# Patient Record
Sex: FEMALE
[De-identification: names, ages, dates, MRNs, and addresses within clinical notes are randomized per-mention and may not be internally consistent; named-entity substitution may affect disease eponyms.]

---

## 2019-09-03 NOTE — EDM.PDOC
ED HPI GENERAL MEDICAL PROBLEM





- General


Chief Complaint: OB/GYN Problem


Stated Complaint: 12WEEKS CRAMP PREGNANT


Time Seen by Provider: 09/03/19 17:48


Source of Information: Reports: Patient


History Limitations: Reports: No Limitations





- History of Present Illness


INITIAL COMMENTS - FREE TEXT/NARRATIVE: 


HISTORY AND PHYSICAL:





History of present illness:


Patient is a 24-year-old female presents to the ED today with concern of lower 

abdominal pain and pregnancy. Patient states she is approximately 11-12 weeks 

along. Patient states she just moved here and does not have an OB/GYN 

established. Patient states she did have an ultrasound early on in pregnancy 

with confirmed intrauterine pregnancy. Patient states she was lifting boxes 

today and began to have lower abdominal pain/cramping. Patient denies any 

vaginal bleeding. Patient denies any direct trauma or injury.





Patient denies fever, chills, chest pain, shortness of breath, or cough. Denies 

headache, neck stiff ness, change in vision, syncope, or near syncope. Denies 

nausea, vomiting, diarrhea, constipation, or dysuria. Has not noted any blood 

in urine or stool. Patient has been eating and drinking appropriately.





Review of systems: 


As per history of present illness and below otherwise all systems reviewed and 

negative.





Past medical history: 


As per history of present illness and as reviewed below otherwise 

noncontributory.





Surgical history: 


As per history of present illness and as reviewed below otherwise 

noncontributory.





Social history: 


See social history for further information





Family history: 


As per history of present illness and as reviewed below otherwise 

noncontributory.





Physical exam:


General: Patient is alert, oriented, and in no acute distress. Patient sitting 

comfortably on exam table.


HEENT: Atraumatic, normocephalic, pupils equal and reactive bilaterally, 

negative for conjunctival pallor or scleral icterus, mucous membranes moist, 

TMs normal bilaterally, throat clear, neck supple, nontender, trachea midline. 

No drooling or trismus noted. No meningeal signs. No hot potato voice noted. 


Lungs: Clear to auscultation, breath sounds equal bilaterally, chest nontender.


Heart: S1S2, regular rate and rhythm without overt murmur


Abdomen: Soft, nondistended. Mild suprapubic tenderness. Negative for masses or 

hepatosplenomegaly. Negative for costovertebral tenderness.


Pelvis: Stable nontender.


Genitourinary: Deferred.


Rectal: Deferred.


Skin: Intact, warm, dry. No lesions or rashes noted.


Extremities: Atraumatic, negative for cords or calf pain. Neurovascular 

unremarkable.


Neuro: Awake, alert, oriented. Cranial nerves II through XII unremarkable. 

Cerebellum unremarkable. Motor and sensory unremarkable throughout. Exam 

nonfocal.





Notes:


Blood type A negative. Patient continues to deny vaginal bleeding.


Discussed the importance for follow-up with an OB/GYN provider.


Voices understanding and is agreeable to plan of care. Denies any further 

questions or concerns at this time.





Diagnostics:


CBC, CMP, UA, hCG, blood type, TVUS





Therapeutics:


None





Prescription:


None





Impression: 


Lower abdominal pain and pregnancy





Plan:


1. Please start and/or continue to take your prenatal vitamin with folic acid 

once daily.


2. Tylenol as needed for pain management. This is safe to use in pregnancy.


3. Follow up with your OB/GYN as discussed. Return to the ED as needed and as 

discussed.





Definitive disposition and diagnosis as appropriate pending reevaluation and 

review of above.





  ** Lower Abdomen


Pain Score (Numeric/FACES): 4





- Related Data


 Allergies











Allergy/AdvReac Type Severity Reaction Status Date / Time


 


No Known Allergies Allergy   Verified 09/03/19 17:42











Home Meds: 


 Home Meds





Pnv No.95/Ferrous Fum/Folic AC [Prenatal Caplet] 1 each PO DAILY 09/03/19 [

History]











Past Medical History


HEENT History: Reports: None


Cardiovascular History: Reports: None


Respiratory History: Reports: None


Gastrointestinal History: Reports: None


Genitourinary History: Reports: None


OB/GYN History: Reports: Pregnancy


Musculoskeletal History: Reports: None


Neurological History: Reports: None


Psychiatric History: Reports: Anxiety


Endocrine/Metabolic History: Reports: None


Hematologic History: Reports: None


Immunologic History: Reports: None


Oncologic (Cancer) History: Reports: None


Dermatologic History: Reports: None





- Infectious Disease History


Infectious Disease History: Reports: None





- Past Surgical History


Head Surgeries/Procedures: Reports: None





Social & Family History





- Tobacco Use


Smoking Status *Q: Former Smoker


Used Tobacco, but Quit: Yes


Month/Year Tobacco Last Used: 2019





- Caffeine Use


Caffeine Use: Reports: None





- Recreational Drug Use


Recreational Drug Use: No





ED ROS GENERAL





- Review of Systems


Review Of Systems: ROS reveals no pertinent complaints other than HPI.





ED EXAM, GENERAL





- Physical Exam


Exam: See Below (see dictation)





Course





- Vital Signs


Last Recorded V/S: 


 Last Vital Signs











Temp  37.1 C   09/03/19 17:39


 


Pulse  83   09/03/19 17:39


 


Resp      


 


BP  107/62   09/03/19 17:39


 


Pulse Ox  97   09/03/19 17:39














- Orders/Labs/Meds


Labs: 


 Laboratory Tests











  09/03/19 09/03/19 09/03/19 Range/Units





  17:58 17:58 17:58 


 


WBC  11.52 H    (4.0-11.0)  K/uL


 


RBC  4.01 L    (4.30-5.90)  M/uL


 


Hgb  12.8    (12.0-16.0)  g/dL


 


Hct  37.5    (36.0-46.0)  %


 


MCV  93.5    (80.0-98.0)  fL


 


MCH  31.9    (27.0-32.0)  pg


 


MCHC  34.1    (31.0-37.0)  g/dL


 


RDW Std Deviation  41.3    (28.0-62.0)  fl


 


RDW Coeff of Shante  12    (11.0-15.0)  %


 


Plt Count  249    (150-400)  K/uL


 


MPV  9.20    (7.40-12.00)  fL


 


Neut % (Auto)  80.4 H    (48.0-80.0)  %


 


Lymph % (Auto)  13.8 L    (16.0-40.0)  %


 


Mono % (Auto)  5.0    (0.0-15.0)  %


 


Eos % (Auto)  0.7    (0.0-7.0)  %


 


Baso % (Auto)  0.1    (0.0-1.5)  %


 


Neut # (Auto)  9.3 H    (1.4-5.7)  K/uL


 


Lymph # (Auto)  1.6    (0.6-2.4)  K/uL


 


Mono # (Auto)  0.6    (0.0-0.8)  K/uL


 


Eos # (Auto)  0.1    (0.0-0.7)  K/uL


 


Baso # (Auto)  0.0    (0.0-0.1)  K/uL


 


Nucleated RBC %  0.0    /100WBC


 


Nucleated RBCs #  0    K/uL


 


Sodium   138   (136-145)  mmol/L


 


Potassium   3.9   (3.5-5.1)  mmol/L


 


Chloride   102   ()  mmol/L


 


Carbon Dioxide   27.2   (21.0-32.0)  mmol/L


 


BUN   10   (7.0-18.0)  mg/dL


 


Creatinine   0.6   (0.6-1.0)  mg/dL


 


Est Cr Clr Drug Dosing   TNP   


 


Estimated GFR (MDRD)   > 60.0   ml/min


 


Glucose   96   ()  mg/dL


 


Calcium   9.2   (8.5-10.1)  mg/dL


 


Total Bilirubin   0.2   (0.2-1.0)  mg/dL


 


AST   14 L   (15-37)  IU/L


 


ALT   22   (14-63)  IU/L


 


Alkaline Phosphatase   29 L   ()  U/L


 


Total Protein   6.6   (6.4-8.2)  g/dL


 


Albumin   3.3 L   (3.4-5.0)  g/dL


 


Globulin   3.3   (2.6-4.0)  g/dL


 


Albumin/Globulin Ratio   1.0   (0.9-1.6)  


 


HCG, Qual     (NEG)  


 


HCG, Quant   654972.0   mIU/mL


 


Urine Color     


 


Urine Appearance     


 


Urine pH     (5.0-8.0)  


 


Ur Specific Gravity     (1.001-1.035)  


 


Urine Protein     (NEGATIVE)  mg/dL


 


Urine Glucose (UA)     (NEGATIVE)  mg/dL


 


Urine Ketones     (NEGATIVE)  mg/dL


 


Urine Occult Blood     (NEGATIVE)  


 


Urine Nitrite     (NEGATIVE)  


 


Urine Bilirubin     (NEGATIVE)  


 


Urine Urobilinogen     (<2.0)  EU/dL


 


Ur Leukocyte Esterase     (NEGATIVE)  


 


Blood Type    A NEGATIVE  














  09/03/19 09/03/19 Range/Units





  17:58 19:35 


 


WBC    (4.0-11.0)  K/uL


 


RBC    (4.30-5.90)  M/uL


 


Hgb    (12.0-16.0)  g/dL


 


Hct    (36.0-46.0)  %


 


MCV    (80.0-98.0)  fL


 


MCH    (27.0-32.0)  pg


 


MCHC    (31.0-37.0)  g/dL


 


RDW Std Deviation    (28.0-62.0)  fl


 


RDW Coeff of Shante    (11.0-15.0)  %


 


Plt Count    (150-400)  K/uL


 


MPV    (7.40-12.00)  fL


 


Neut % (Auto)    (48.0-80.0)  %


 


Lymph % (Auto)    (16.0-40.0)  %


 


Mono % (Auto)    (0.0-15.0)  %


 


Eos % (Auto)    (0.0-7.0)  %


 


Baso % (Auto)    (0.0-1.5)  %


 


Neut # (Auto)    (1.4-5.7)  K/uL


 


Lymph # (Auto)    (0.6-2.4)  K/uL


 


Mono # (Auto)    (0.0-0.8)  K/uL


 


Eos # (Auto)    (0.0-0.7)  K/uL


 


Baso # (Auto)    (0.0-0.1)  K/uL


 


Nucleated RBC %    /100WBC


 


Nucleated RBCs #    K/uL


 


Sodium    (136-145)  mmol/L


 


Potassium    (3.5-5.1)  mmol/L


 


Chloride    ()  mmol/L


 


Carbon Dioxide    (21.0-32.0)  mmol/L


 


BUN    (7.0-18.0)  mg/dL


 


Creatinine    (0.6-1.0)  mg/dL


 


Est Cr Clr Drug Dosing    


 


Estimated GFR (MDRD)    ml/min


 


Glucose    ()  mg/dL


 


Calcium    (8.5-10.1)  mg/dL


 


Total Bilirubin    (0.2-1.0)  mg/dL


 


AST    (15-37)  IU/L


 


ALT    (14-63)  IU/L


 


Alkaline Phosphatase    ()  U/L


 


Total Protein    (6.4-8.2)  g/dL


 


Albumin    (3.4-5.0)  g/dL


 


Globulin    (2.6-4.0)  g/dL


 


Albumin/Globulin Ratio    (0.9-1.6)  


 


HCG, Qual  POSITIVE H   (NEG)  


 


HCG, Quant    mIU/mL


 


Urine Color   YELLOW  


 


Urine Appearance   CLEAR  


 


Urine pH   6.5  (5.0-8.0)  


 


Ur Specific Gravity   1.015  (1.001-1.035)  


 


Urine Protein   NEGATIVE  (NEGATIVE)  mg/dL


 


Urine Glucose (UA)   NEGATIVE  (NEGATIVE)  mg/dL


 


Urine Ketones   NEGATIVE  (NEGATIVE)  mg/dL


 


Urine Occult Blood   NEGATIVE  (NEGATIVE)  


 


Urine Nitrite   NEGATIVE  (NEGATIVE)  


 


Urine Bilirubin   NEGATIVE  (NEGATIVE)  


 


Urine Urobilinogen   0.2  (<2.0)  EU/dL


 


Ur Leukocyte Esterase   NEGATIVE  (NEGATIVE)  


 


Blood Type    














Departure





- Departure


Time of Disposition: 19:54


Disposition: Home, Self-Care 01


Clinical Impression: 


 Abdominal cramping affecting pregnancy








- Discharge Information


Referrals: 


PCP,None [Primary Care Provider] - 


Forms:  ED Department Discharge


Additional Instructions: 


The following information is given to patients seen in the emergency department 

who are being discharged to home. This information is to outline your options 

for follow-up care. We provide all patients seen in our emergency department 

with a follow-up referral.





The need for follow-up, as well as the timing and circumstances, are variable 

depending upon the specifics of your emergency department visit.





If you don't have a primary care physician on staff, we will provide you with a 

referral. We always advise you to contact your personal physician following an 

emergency department visit to inform them of the circumstance of the visit and 

for follow-up with them and/or the need for any referrals to a consulting 

specialist.





The emergency department will also refer you to a specialist when appropriate. 

This referral assures that you have the opportunity for follow-up care with a 

specialist. All of these measure are taken in an effort to provide you with 

optimal care, which includes your follow-up.





Under all circumstances we always encourage you to contact your private 

physician who remains a resource for coordinating your care. When calling for 

follow-up care, please make the office aware that this follow-up is from your 

recent emergency room visit. If for any reason you are refused follow-up, 

please contact the [facility] Emergency 

Department at [phone] and asked to speak to the emergency department 

charge nurse.





[facility]


Primary Care


[address] [place] [id number]


Phone: [phone]


Fax: [phone]





[facility]


[address] [place] [address]


Phone: [phone]


Fax: [phone]





[facility]


[address] [place] [address]


Phone: [phone]


Fax: [phone]





1. Please start and/or continue to take your prenatal vitamin with folic acid 

once daily.


2. Tylenol as needed for pain management. This is safe to use in pregnancy.


3. Follow up with your OB/GYN as discussed. Return to the ED as needed and as 

discussed.

## 2019-09-03 NOTE — US
INDICATION: low pelvic pain x 2 days



pain has lessened



denies bleeding



OBSTETRICAL ULTRASOUND



Technique:  Transabdominal scanning of the pelvis was performed.



Findings:   The uterus contains a gestational sac.  The gestational sac 

contains a yolk sac and an embryonic pole which exhibits cardiac activity 

with a heart rate of 163 BPM.  The crown-rump length corresponds to an 

estimated menstrual age of 12 weeks 0 days and an SAURABH of 3/17/2020.



The ovaries are not visualized.  No significant free pelvic fluid is 

identified.



IMPRESSION:  Live early intrauterine pregnancy with estimated menstrual age 

of 12 weeks 0 days and SAURABH of 3/17/2020.  No acute abnormality is seen.



SILVIA RYDER MD



[facility]



Dictated by: Praneeth Ryder MD @ 09/03/2019 19:51:27



(Electronically Signed)

## 2020-03-21 ENCOUNTER — HOSPITAL ENCOUNTER (INPATIENT)
Dept: HOSPITAL 56 - MW.OBCHECK | Age: 25
LOS: 3 days | Discharge: HOME | End: 2020-03-24
Attending: OBSTETRICS & GYNECOLOGY | Admitting: OBSTETRICS & GYNECOLOGY
Payer: MEDICAID

## 2020-03-21 DIAGNOSIS — O48.0: Primary | ICD-10-CM

## 2020-03-21 DIAGNOSIS — Z3A.40: ICD-10-CM

## 2020-03-21 RX ADMIN — MISOPROSTOL PRN MCG: 100 TABLET ORAL at 21:12

## 2020-03-21 RX ADMIN — MISOPROSTOL PRN MCG: 100 TABLET ORAL at 21:06

## 2020-03-21 NOTE — PCM.LDHP
L&D History of Present Illness





- General


Date of Service: 20


Admit Problem/Dx: 


 Patient Status Order with Admit Dx/Problem





20 20:20


Patient Status [ADT] Routine 








 Admission Diagnosis/Problem











Admission Diagnosis/Problem    Pregnant - planned














20 21:37


23 yo  admit for elective IOL at 40 +0 weeks; A-, Rubella non-immune, GBS 

negative, SVE 2/70/-3 per nurse report





Source of Information: Patient


History Limitations: Reports: No Limitations





- Related Data


Allergies/Adverse Reactions: 


 Allergies











Allergy/AdvReac Type Severity Reaction Status Date / Time


 


No Known Allergies Allergy   Verified 19 17:42











Home Medications: 


 Home Meds





Pnv No.95/Ferrous Fum/Folic AC [Prenatal Caplet] 1 each PO DAILY 19 [

History]











Past Medical History


HEENT History: Reports: None


Cardiovascular History: Reports: None


Respiratory History: Reports: None


Gastrointestinal History: Reports: None


Genitourinary History: Reports: None


OB/GYN History: Reports: Pregnancy


Musculoskeletal History: Reports: None


Neurological History: Reports: None


Psychiatric History: Reports: Anxiety


Endocrine/Metabolic History: Reports: None


Hematologic History: Reports: None


Immunologic History: Reports: None


Oncologic (Cancer) History: Reports: None


Dermatologic History: Reports: None





- Infectious Disease History


Infectious Disease History: Reports: None





- Past Surgical History


Head Surgeries/Procedures: Reports: None





Social & Family History





- Caffeine Use


Caffeine Use: Reports: None





H&P Review of Systems





- Review of Systems:


Review Of Systems: See Below


General: Reports: No Symptoms


HEENT: Reports: No Symptoms


Pulmonary: Reports: No Symptoms


Cardiovascular: Reports: No Symptoms


Gastrointestinal: Reports: No Symptoms


Genitourinary: Reports: No Symptoms


Musculoskeletal: Reports: No Symptoms


Skin: Reports: No Symptoms


Psychiatric: Reports: No Symptoms


Neurological: Reports: No Symptoms


Hematologic/Lymphatic: Reports: No Symptoms


Immunologic: Reports: No Symptoms





L&D Exam





- Exam


Exam: See Below





- Vital Signs


Weight: 155 lb





- OB Specific


Fundal Height In cm: 40


Fetal Movement: Active


Fetal Heart Tones: Present


Fetal Heart Rate (FHR) Variability: Moderate (6-25 bmp)





- Peres Score


Peres Score Consistency: Soft


Peres Score Effacement: 51-70%


Peres Score Dilation: 1-2 cm


Peres Score Infant's Station: -3





- Exam


General: Alert, Oriented, Cooperative


Lungs: Normal Respiratory Effort


GI/Abdominal Exam: Soft, Non-Tender


Rectal Exam: Deferred


Genitourinary: Deferred


Back Exam: Normal Inspection, Full Range of Motion


Extremities: Normal Inspection, Normal Range of Motion, Non-Tender


Skin: Warm, Dry, Intact


Neurological: Normal Gait, Normal Speech, Normal Tone, Sensation Intact


Psychiatric: Alert, Normal Affect, Normal Mood





- Patient Data


Lab Results Last 24 hrs: 


 Laboratory Results - last 24 hr











  20 Range/Units





  20:46 


 


WBC  9.70  (4.0-11.0)  K/uL


 


RBC  3.75 L  (4.30-5.90)  M/uL


 


Hgb  11.5 L  (12.0-16.0)  g/dL


 


Hct  35.0 L  (36.0-46.0)  %


 


MCV  93.3  (80.0-98.0)  fL


 


MCH  30.7  (27.0-32.0)  pg


 


MCHC  32.9  (31.0-37.0)  g/dL


 


RDW Std Deviation  46.9  (28.0-62.0)  fl


 


RDW Coeff of Shante  14  (11.0-15.0)  %


 


Plt Count  347  (150-400)  K/uL


 


MPV  10.10  (7.40-12.00)  fL


 


Nucleated RBC %  0.0  /100WBC


 


Nucleated RBCs #  0  K/uL











Result Diagrams: 


 20 20:46








- Problem List


(1) Supervision of normal IUP (intrauterine pregnancy) in primigravida


SNOMED Code(s): 59120694, 445949759, 744112086, 851773664


   ICD Code: Z34.00 - ENCNTR FOR SUPRVSN OF NORMAL FIRST PREGNANCY, UNSP 

TRIMESTER   Status: Acute   Priority: High   Current Visit: Yes   


Qualifiers: 


   Trimester: third trimester   Qualified Code(s): Z34.03 - Encounter for 

supervision of normal first pregnancy, third trimester   


Problem List Initiated/Reviewed/Updated: Yes


Orders Last 24hrs: 


 Active Orders 24 hr











 Category Date Time Status


 


 Patient Status [ADT] Routine ADT  20 20:20 Active


 


 Bedrest Bathroom Privileges [RC] ASDIRECTED Care  20 20:25 Active


 


 Communication Order [RC] ASDIRECTED Care  20 20:25 Active


 


 Communication Order [RC] ASDIRECTED Care  20 20:25 Active


 


 Communication Order [RC] ASDIRECTED Care  20 20:25 Active


 


 Fetal Heart Tones [RC] CONTINUOUS Care  20 20:25 Active


 


 Fetal Non Stress Test [RC] PER UNIT ROUTINE Care  20 20:20 Active


 


 May Shower [RC] ASDIRECTED Care  20 20:25 Active


 


 Notify Provider [RC] PRN Care  20 20:25 Active


 


 Notify Provider [RC] PRN Care  20 20:25 Active


 


 Notify Provider [RC] PRN Care  20 20:25 Active


 


 Notify Provider [RC] STAT Care  20 20:25 Active


 


 Oxygen Therapy [RC] ASDIRECTED Care  20 20:25 Active


 


 Up ad Tri [RC] ASDIRECTED Care  20 20:25 Active


 


 Vaginal Exam [RC] PRN Care  20 20:25 Active


 


 Vaginal Exam [RC] PRN Care  20 20:25 Active


 


 Vital Signs [RC] PER UNIT ROUTINE Care  20 20:25 Active


 


 Vital Signs [RC] PER UNIT ROUTINE Care  20 20:25 Active


 


 RPR (SYPHILIS SERO) W/ RFLX [REF] Routine Lab  20 20:46 Received


 


 TYPE AND SCREEN [BBK] Routine Lab  20 20:46 Received


 


 Butorphanol [Stadol] Med  20 20:25 Active





 1 mg IVPUSH Q1H PRN   


 


 Carboprost Tromethamine [Hemabate DS] Med  20 20:25 Active





 250 mcg IM ASDIRECTED PRN   


 


 Lactated Ringers [Ringers, Lactated] 1,000 ml Med  20 20:30 Active





 IV ASDIRECTED   


 


 Lidocaine 1% [Xylocaine 1%] Med  20 20:25 Active





 50 ml INJECT ONETIME PRN   


 


 Methylergonovine [Methergine] Med  20 20:25 Active





 0.2 mg IM ASDIRECTED PRN   


 


 Nalbuphine [Nubain] Med  20 20:25 Active





 10 mg IVPUSH Q1H PRN   


 


 Ondansetron [Zofran] Med  20 20:25 Active





 4 mg IVPUSH Q6H PRN   


 


 Oxytocin/0.9 % Sodium Chloride [Oxytocin 30 Unit/500 ML Med  20 20:30 

Active





 -NS]   





 30 unit in 500 ml IV TITRATE   


 


 Oxytocin/0.9 % Sodium Chloride [Oxytocin 30 Unit/500 ML Med  20 20:30 

Active





 -NS]   





 30 unit in 500 ml IV TITRATE   


 


 Sodium Chloride 0.9% [Normal Saline] Med  20 20:25 Active





 10 ml IV ASDIRECTED PRN   


 


 Sodium Chloride 0.9% [Saline Flush] Med  20 20:25 Active





 10 ml FLUSH ASDIRECTED PRN   


 


 Sodium Chloride 0.9% [Saline Flush] Med  20 20:25 Active





 2.5 ml FLUSH ASDIRECTED PRN   


 


 Terbutaline [Brethine] Med  20 20:25 Active





 0.25 mg SUBCUT ASDIRECTED PRN   


 


 Tranexamic Acid [Cyklokapron] 1,000 mg Med  20 20:25 Active





 Sodium Chloride 0.9% [Normal Saline] 100 ml   





 IV ONETIME   


 


 Water For Irrigation,Sterile [Sterile Water for Med  20 20:25 Active





 Irrigation]   





 1,000 ml IRR ASDIRECTED PRN   


 


 miSOPROStoL [Cytotec] Med  20 20:25 Active





 200 mcg PO ONETIME PRN   


 


 miSOPROStoL [Cytotec] Med  20 21:00 Active





 25 mcg PO Q4H PRN   


 


 miSOPROStoL [Cytotec] Med  20 21:00 Active





 25 mcg VAG Q4H PRN   


 


 Fetal Scalp Electrode [WOMSER] Per Unit Routine Oth  20 20:25 Ordered


 


 Medication Administration Instruction [OM.PC] Q3H Oth  20 20:30 Ordered


 


 Peripheral IV Insertion Adult [OM.PC] Routine Oth  20 20:25 Ordered


 


 Resuscitation Status Routine Resus Stat  20 20:25 Ordered








 Medication Orders





Butorphanol Tartrate (Stadol)  1 mg IVPUSH Q1H PRN


   PRN Reason: Pain


Carboprost Tromethamine (Hemabate Ds)  250 mcg IM ASDIRECTED PRN


   PRN Reason: Post Partum Hemorrhage


Lactated Ringer's (Ringers, Lactated)  1,000 mls @ 150 mls/hr IV ASDIRECTED CHRISTIAN


Oxytocin/Sodium Chloride (Oxytocin 30 Unit/500 Ml-Ns)  30 unit in 500 mls @ 999 

mls/hr IV TITRATE CHRISTIAN


Oxytocin/Sodium Chloride (Oxytocin 30 Unit/500 Ml-Ns)  30 unit in 500 mls @ 2 

mls/hr IV TITRATE CHRISTIAN; Protocol


Tranexamic Acid 1,000 mg/ (Sodium Chloride)  110 mls @ 660 mls/hr IV ONETIME PRN


   PRN Reason: Bleeding


Lidocaine HCl (Xylocaine 1%)  50 ml INJECT ONETIME PRN


   PRN Reason: Laceration repair


Methylergonovine Maleate (Methergine)  0.2 mg IM ASDIRECTED PRN


   PRN Reason: Post Partum Hemorrhage


Misoprostol (Cytotec)  200 mcg PO ONETIME PRN


   PRN Reason: Post Partum Hemorrhage


Misoprostol (Cytotec)  25 mcg VAG Q4H PRN


   PRN Reason: Cervical Ripening


   Last Admin: 20 21:06  Dose: 25 mcg


Misoprostol (Cytotec)  25 mcg PO Q4H PRN


   PRN Reason: Cervical Ripening


   Last Admin: 20 21:12  Dose: 25 mcg


Nalbuphine HCl (Nubain)  10 mg IVPUSH Q1H PRN


   PRN Reason: Pain (severe 7-10)


Ondansetron HCl (Zofran)  4 mg IVPUSH Q6H PRN


   PRN Reason: Nausea/Vomiting


Sodium Chloride (Saline Flush)  10 ml FLUSH ASDIRECTED PRN


   PRN Reason: Keep Vein Open


Sodium Chloride (Saline Flush)  2.5 ml FLUSH ASDIRECTED PRN


   PRN Reason: Keep Vein Open


Sodium Chloride (Normal Saline)  10 ml IV ASDIRECTED PRN


   PRN Reason: IV Use


Sterile Water (Sterile Water For Irrigation)  1,000 ml IRR ASDIRECTED PRN


   PRN Reason: delivery


Terbutaline Sulfate (Brethine)  0.25 mg SUBCUT ASDIRECTED PRN


   PRN Reason: Tacysystole








Assessment/Plan Comment:: 





IOL


A: 23 yo  admit for elective IOL at 40 +0 weeks; A-, Rubella non-immune, 

GBS negative, SVE 2/70/-3 per nurse report


P: Admit, induction of labor, anticipate , Dr. Jewell kyle

## 2020-03-22 RX ADMIN — MISOPROSTOL PRN MCG: 100 TABLET ORAL at 05:40

## 2020-03-22 RX ADMIN — MISOPROSTOL PRN MCG: 100 TABLET ORAL at 10:37

## 2020-03-22 RX ADMIN — MISOPROSTOL PRN MCG: 100 TABLET ORAL at 01:30

## 2020-03-22 RX ADMIN — MISOPROSTOL PRN MCG: 100 TABLET ORAL at 05:38

## 2020-03-22 RX ADMIN — MISOPROSTOL PRN MCG: 100 TABLET ORAL at 01:31

## 2020-03-22 NOTE — PCM.PREANE
Preanesthetic Assessment





- Anesthesia/Transfusion/Family Hx


Anesthesia History: No Prior Anesthesia


Family History of Anesthesia Reaction: No


Transfusion History: No Prior Transfusion(s)


Intubation History: Unknown





- Review of Systems


General: No Symptoms


Pulmonary: No Symptoms


Cardiovascular: No Symptoms


Gastrointestinal: Abdominal Pain (labor pain)


Neurological: No Symptoms


Other: Reports: None





- Physical Assessment


Height: 5 ft 2 in


Weight: 70.307 kg


ASA Class: 2


Mental Status: Alert & Oriented x3


Airway Class: Mallampati = 2


Dentition: Reports: Normal Dentition


Thyro-Mental Finger Breadths: 3


Mouth Opening Finger Breadths: 3


ROM/Head Extension: Full


Lungs: Clear to Auscultation, Normal Respiratory Effort


Cardiovascular: Regular Rate, Regular Rhythm





- Lab


Values: 





 Laboratory Last Values











WBC  9.70 K/uL (4.0-11.0)   03/21/20  20:46    


 


RBC  3.75 M/uL (4.30-5.90)  L  03/21/20  20:46    


 


Hgb  11.5 g/dL (12.0-16.0)  L  03/21/20  20:46    


 


Hct  35.0 % (36.0-46.0)  L  03/21/20  20:46    


 


MCV  93.3 fL (80.0-98.0)   03/21/20  20:46    


 


MCH  30.7 pg (27.0-32.0)   03/21/20  20:46    


 


MCHC  32.9 g/dL (31.0-37.0)   03/21/20  20:46    


 


RDW Std Deviation  46.9 fl (28.0-62.0)   03/21/20  20:46    


 


RDW Coeff of Shante  14 % (11.0-15.0)   03/21/20  20:46    


 


Plt Count  347 K/uL (150-400)   03/21/20  20:46    


 


MPV  10.10 fL (7.40-12.00)   03/21/20  20:46    


 


Nucleated RBC %  0.0 /100WBC  03/21/20  20:46    


 


Nucleated RBCs #  0 K/uL  03/21/20  20:46    


 


Blood Type  A NEGATIVE   03/21/20  20:46    


 


Antibody Screen  NEGATIVE   03/21/20  20:46    














- Allergies


Allergies/Adverse Reactions: 


 Allergies











Allergy/AdvReac Type Severity Reaction Status Date / Time


 


No Known Allergies Allergy   Verified 09/03/19 17:42














- Blood


Blood Available: No





- Anesthesia Plan


Pre-Op Medication Ordered: None





- Acknowledgements


Anesthesia Type Planned: Epidural


Pt an Appropriate Candidate for the Planned Anesthesia: Yes


Alternatives and Risks of Anesthesia Discussed w Pt/Guardian: Yes


Pt/Guardian Understands and Agrees with Anesthesia Plan: Yes





PreAnesthesia Questionnaire


HEENT History: Reports: None


Cardiovascular History: Reports: None


Respiratory History: Reports: None


Gastrointestinal History: Reports: None


Genitourinary History: Reports: None


OB/GYN History: Reports: Pregnancy


Musculoskeletal History: Reports: None


Neurological History: Reports: None


Psychiatric History: Reports: Anxiety


Endocrine/Metabolic History: Reports: None


Hematologic History: Reports: None


Immunologic History: Reports: None


Oncologic (Cancer) History: Reports: None


Dermatologic History: Reports: None





- Infectious Disease History


Infectious Disease History: Reports: None





- Past Surgical History


Head Surgeries/Procedures: Reports: None





- SUBSTANCE USE


Smoking Status *Q: Never Smoker


Tobacco Use Within Last Twelve Months: No


Second Hand Smoke Exposure: No


Recreational Drug Use History: No





- HOME MEDS


Home Medications: 


 Home Meds





Pnv No.95/Ferrous Fum/Folic AC [Prenatal Caplet] 1 each PO DAILY 09/03/19 [

History]











- CURRENT (IN HOUSE) MEDS


Current Meds: 





 Current Medications





Butorphanol Tartrate (Stadol)  1 mg IVPUSH Q1H PRN


   PRN Reason: Pain


   Last Admin: 03/22/20 19:04 Dose:  1 mg


Carboprost Tromethamine (Hemabate Ds)  250 mcg IM ASDIRECTED PRN


   PRN Reason: Post Partum Hemorrhage


Lactated Ringer's (Ringers, Lactated)  1,000 mls @ 150 mls/hr IV ASDIRECTED CHRISTIAN


   Last Admin: 03/22/20 19:08 Dose:  999 mls/hr


Oxytocin/Sodium Chloride (Oxytocin 30 Unit/500 Ml-Ns)  30 unit in 500 mls @ 999 

mls/hr IV TITRATE CHRISTIAN


Oxytocin/Sodium Chloride (Oxytocin 30 Unit/500 Ml-Ns)  30 unit in 500 mls @ 2 

mls/hr IV TITRATE CHRISTIAN; Protocol


Tranexamic Acid 1,000 mg/ (Sodium Chloride)  110 mls @ 660 mls/hr IV ONETIME PRN


   PRN Reason: Bleeding


Lidocaine HCl (Xylocaine 1%)  50 ml INJECT ONETIME PRN


   PRN Reason: Laceration repair


Methylergonovine Maleate (Methergine)  0.2 mg IM ASDIRECTED PRN


   PRN Reason: Post Partum Hemorrhage


Misoprostol (Cytotec)  200 mcg PO ONETIME PRN


   PRN Reason: Post Partum Hemorrhage


Misoprostol (Cytotec)  25 mcg VAG Q4H PRN


   PRN Reason: Cervical Ripening


   Last Admin: 03/22/20 10:37 Dose:  25 mcg


Misoprostol (Cytotec)  25 mcg PO Q4H PRN


   PRN Reason: Cervical Ripening


   Last Admin: 03/22/20 10:37 Dose:  25 mcg


Nalbuphine HCl (Nubain)  10 mg IVPUSH Q1H PRN


   PRN Reason: Pain (severe 7-10)


Ondansetron HCl (Zofran)  4 mg IVPUSH Q6H PRN


   PRN Reason: Nausea/Vomiting


Sodium Chloride (Saline Flush)  10 ml FLUSH ASDIRECTED PRN


   PRN Reason: Keep Vein Open


Sodium Chloride (Saline Flush)  2.5 ml FLUSH ASDIRECTED PRN


   PRN Reason: Keep Vein Open


Sodium Chloride (Normal Saline)  10 ml IV ASDIRECTED PRN


   PRN Reason: IV Use


Sterile Water (Sterile Water For Irrigation)  1,000 ml IRR ASDIRECTED PRN


   PRN Reason: delivery


Terbutaline Sulfate (Brethine)  0.25 mg SUBCUT ASDIRECTED PRN


   PRN Reason: Tacysystole





Discontinued Medications





Fentanyl (Sublimaze) Confirm Administered Dose 100 mcg .ROUTE .STK-MED ONE


   Stop: 03/22/20 21:07


Fentanyl/Bupivacaine HCl (Fentanyl/Bupivacaine/Ns 2 Mcg-0.125% 250 Ml) Confirm 

Administered Dose 250 mls @ as directed .ROUTE .STK-MED ONE


   Stop: 03/22/20 21:07

## 2020-03-23 PROCEDURE — 3E0R3BZ INTRODUCTION OF ANESTHETIC AGENT INTO SPINAL CANAL, PERCUTANEOUS APPROACH: ICD-10-PCS | Performed by: OBSTETRICS & GYNECOLOGY

## 2020-03-23 PROCEDURE — 10907ZC DRAINAGE OF AMNIOTIC FLUID, THERAPEUTIC FROM PRODUCTS OF CONCEPTION, VIA NATURAL OR ARTIFICIAL OPENING: ICD-10-PCS | Performed by: OBSTETRICS & GYNECOLOGY

## 2020-03-23 PROCEDURE — 3E0P7VZ INTRODUCTION OF HORMONE INTO FEMALE REPRODUCTIVE, VIA NATURAL OR ARTIFICIAL OPENING: ICD-10-PCS | Performed by: OBSTETRICS & GYNECOLOGY

## 2020-03-23 NOTE — PCM.DEL
L & D Note





- General Info


Date of Service: 20


Mother's Due Date: 20





- Delivery Note


Labor: Augmented by ARM, Augmented by Oxytocin


Cervical Ripening Method: Misoprostil


Delivery Outcome: Livebirth


Infant Delivery Method: Spontaneous Vaginal Delivery-Single


Infant Delivery Mode: Spontaneous


Nuchal Cord: Present (x1 summersaulted through)


Anesthesia Type: Epidural


Amniotic Fluid Description: Meconium Stained (Light meconium)


Episiotomy Type: None


Laceration: None


Placenta: Intact, Spontaneous


Cord: 3 Vessels


Estimated Blood Loss: 300


Resuscitation Needed: No


: Stimulated, Warmed


APGAR Score 1 min: 8


APGAR Score 5 min: 9


Second Stage Interventions: Reports: Second Nurse Assessed Progress of Descent, 

Second Nurse Reviewed Contraction Pattern, Second Nurse Reviewed Fetal Heart 

Tones, Encouragement Given, Pushing Effectively, Pushing, Pulls Own Legs Back


Delivery Comments (Free Text/Narrative):: 





 viable male; light meconium,  head delivered with good pushing, nuchal x1, 

summersaulted through; shoulders and body followed easily after.  Cord clamped 

immediately and cut by FOB.  Baby to warmer for assessment.  Placenta delivered 

grossly intact,  mL, 3VC.  Perineum intact.  Pitocin to IVF.  APGARs 8/9

, weight pending.  Baby back to mom's abdomen skin-to-skin with nurse at 

bedside for assessment.   





- General Info


Date of Service: 20


Admission Dx/Problem (Free Text): 


 Patient Status Order with Admit Dx/Problem





20 20:20


Patient Status [ADT] Routine 








 Admission Diagnosis/Problem











Admission Diagnosis/Problem    Pregnant - planned














20 21:37


25 yo  admit for elective IOL at 40 +0 weeks; A-, Rubella non-immune, GBS 

negative, SVE 2/70/-3 per nurse report





Functional Status: Reports: Pain Controlled





- Review of Systems


General: Reports: No Symptoms


HEENT: Reports: No Symptoms


Pulmonary: Reports: No Symptoms


Cardiovascular: Reports: No Symptoms


Gastrointestinal: Reports: No Symptoms


Genitourinary: Reports: No Symptoms


Musculoskeletal: Reports: No Symptoms


Skin: Reports: No Symptoms


Neurological: Reports: No Symptoms


Psychiatric: Reports: No Symptoms





- Patient Data


Weight - Most Recent: 155 lb


Med Orders - Current: 


 Current Medications





Butorphanol Tartrate (Stadol)  1 mg IVPUSH Q1H PRN


   PRN Reason: Pain


   Last Admin: 20 19:04 Dose:  1 mg


Carboprost Tromethamine (Hemabate Ds)  250 mcg IM ASDIRECTED PRN


   PRN Reason: Post Partum Hemorrhage


Lactated Ringer's (Ringers, Lactated)  1,000 mls @ 150 mls/hr IV ASDIRECTED CHRISTIAN


   Last Admin: 20 02:21 Dose:  150 mls/hr


Oxytocin/Sodium Chloride (Oxytocin 30 Unit/500 Ml-Ns)  30 unit in 500 mls @ 999 

mls/hr IV TITRATE CHRISTIAN


Oxytocin/Sodium Chloride (Oxytocin 30 Unit/500 Ml-Ns)  30 unit in 500 mls @ 2 

mls/hr IV TITRATE CHRISTIAN; Protocol


   Last Titration: 20 04:14 Dose:  10 munits/min, 10 mls/hr


Tranexamic Acid 1,000 mg/ (Sodium Chloride)  110 mls @ 660 mls/hr IV ONETIME PRN


   PRN Reason: Bleeding


Lidocaine HCl (Xylocaine 1%)  50 ml INJECT ONETIME PRN


   PRN Reason: Laceration repair


Methylergonovine Maleate (Methergine)  0.2 mg IM ASDIRECTED PRN


   PRN Reason: Post Partum Hemorrhage


Misoprostol (Cytotec)  200 mcg PO ONETIME PRN


   PRN Reason: Post Partum Hemorrhage


Misoprostol (Cytotec)  25 mcg VAG Q4H PRN


   PRN Reason: Cervical Ripening


   Last Admin: 20 10:37 Dose:  25 mcg


Misoprostol (Cytotec)  25 mcg PO Q4H PRN


   PRN Reason: Cervical Ripening


   Last Admin: 20 10:37 Dose:  25 mcg


Nalbuphine HCl (Nubain)  10 mg IVPUSH Q1H PRN


   PRN Reason: Pain (severe 7-10)


Ondansetron HCl (Zofran)  4 mg IVPUSH Q6H PRN


   PRN Reason: Nausea/Vomiting


Sodium Chloride (Saline Flush)  10 ml FLUSH ASDIRECTED PRN


   PRN Reason: Keep Vein Open


Sodium Chloride (Saline Flush)  2.5 ml FLUSH ASDIRECTED PRN


   PRN Reason: Keep Vein Open


Sodium Chloride (Normal Saline)  10 ml IV ASDIRECTED PRN


   PRN Reason: IV Use


Sterile Water (Sterile Water For Irrigation)  1,000 ml IRR ASDIRECTED PRN


   PRN Reason: delivery


Terbutaline Sulfate (Brethine)  0.25 mg SUBCUT ASDIRECTED PRN


   PRN Reason: Tacysystole





Discontinued Medications





Fentanyl (Sublimaze) Confirm Administered Dose 100 mcg .ROUTE .STK-MED ONE


   Stop: 20 21:07


Fentanyl/Bupivacaine HCl (Fentanyl/Bupivacaine/Ns 2 Mcg-0.125% 250 Ml) Confirm 

Administered Dose 250 mls @ as directed .ROUTE .STK-MED ONE


   Stop: 20 21:07











- Exam


General: Alert, Oriented, Cooperative, No Acute Distress


Lungs: Normal Respiratory Effort


GI/Abdominal Exam: Soft, Non-Tender


 (Female) Exam: Normal External Exam


Back Exam: Normal Inspection


Extremities: Normal Inspection, Normal Capillary Refill


Skin: Warm, Dry, Intact


Neurological: No New Focal Deficit, Normal Speech


Psy/Mental Status: Alert, Normal Affect, Normal Mood





- Problem List & Annotations


(1) Supervision of normal IUP (intrauterine pregnancy) in primigravida


SNOMED Code(s): 53275060, 693927917, 508782228, 066941312


   Code(s): Z34.00 - ENCNTR FOR SUPRVSN OF NORMAL FIRST PREGNANCY, UNSP 

TRIMESTER   Status: Acute   Priority: High   Current Visit: Yes   


Qualifiers: 


   Trimester: third trimester   Qualified Code(s): Z34.03 - Encounter for 

supervision of normal first pregnancy, third trimester   





(2) Normal vaginal delivery


SNOMED Code(s): 88737200, 054842786


   Code(s): O80 - ENCOUNTER FOR FULL-TERM UNCOMPLICATED DELIVERY   Status: 

Acute   Priority: High   Current Visit: Yes   





- Problem List Review


Problem List Initiated/Reviewed/Updated: Yes





- Plan


Plan:: 





IOL


A: 25 yo  admit for elective IOL at 40 +0 weeks; A-, Rubella non-immune, 

GBS negative, SVE 2/70/-3 per nurse report


P: Admit, induction of labor, anticipate , Dr. Corcoran updated





Admit


A:  viable male, APGARs 8/9, weight pending.  Placenta delivered grossly 

intact, 3VC,  mL.  Intact perineum. Fundus firm.


P: Routine postpartum plan of care.  Dr. Corcoran updated.

## 2020-03-24 NOTE — PCM.PNPP
- General Info


Date of Service: 20


Functional Status: Reports: Pain Controlled





- Review of Systems


General: Reports: No Symptoms


HEENT: Reports: No Symptoms


Pulmonary: Reports: No Symptoms


Cardiovascular: Reports: No Symptoms


Gastrointestinal: Reports: No Symptoms


Genitourinary: Reports: No Symptoms


Musculoskeletal: Reports: No Symptoms


Skin: Reports: No Symptoms


Neurological: Reports: No Symptoms


Psychiatric: Reports: No Symptoms





- General Info


Date of Service: 20





- Patient Data


Vital Signs - Most Recent: 


 Last Vital Signs











Temp  36.1 C   20 04:00


 


Pulse  61   20 04:00


 


Resp  16   20 04:00


 


BP  101/64   20 04:00


 


Pulse Ox  100   20 04:00











Weight - Most Recent: 70.307 kg


Lab Results - Last 24 Hours: 


 Laboratory Results - last 24 hr











  20 Range/Units





  10:32 05:58 


 


Hgb   9.9 L  (12.0-16.0)  g/dL


 


Hct   29.6 L  (36.0-46.0)  %


 


Antibody Screen  NEGATIVE   


 


Fetal Screen  NEGATIVE   (NEGATIVE)  


 


RhIG Candidate?  YES   


 


Rhogam Indicated  YES, BABY RH POS H   











Med Orders - Current: 


 Current Medications





Acetaminophen (Tylenol Extra Strength)  500 mg PO Q4H PRN


   PRN Reason: Pain


Acetaminophen (Tylenol Extra Strength)  1,000 mg PO Q4H PRN


   PRN Reason: Pain


   Last Admin: 20 22:14 Dose:  1,000 mg


Benzocaine/Menthol (Dermoplast Pain Relief 20%-0.5% Spray)  78 gm TOP 

ASDIRECTED PRN


   PRN Reason: Perineal Comfort Measure


   Last Admin: 20 12:23 Dose:  1 can


Bisacodyl (Dulcolax)  10 mg RECTAL ONETIME PRN


   PRN Reason: Constipation


Docusate Sodium (Colace)  100 mg PO BID PRN


   PRN Reason: Constipation


Emollient Ointment (Lansinoh Hpa)  0 gm TOP ASDIRECTED PRN


   PRN Reason: Sore Nipples


Ibuprofen (Motrin)  400 mg PO Q4H PRN


   PRN Reason: Pain


Ibuprofen (Motrin)  800 mg PO Q6H PRN


   PRN Reason: Pain


   Last Admin: 20 20:18 Dose:  800 mg


Oxycodone HCl (Oxycodone)  5 mg PO Q2H PRN


   PRN Reason: Pain


   Last Admin: 20 22:14 Dose:  5 mg


Witch Hazel (Tucks)  1 pad TOP ASDIRECTED PRN


   PRN Reason: comfort care


   Last Admin: 20 12:24 Dose:  1 tub





Discontinued Medications





Butorphanol Tartrate (Stadol)  1 mg IVPUSH Q1H PRN


   PRN Reason: Pain


   Last Admin: 20 19:04 Dose:  1 mg


Carboprost Tromethamine (Hemabate Ds)  250 mcg IM ASDIRECTED PRN


   PRN Reason: Post Partum Hemorrhage


Fentanyl (Sublimaze) Confirm Administered Dose 100 mcg .ROUTE .STK-MED ONE


   Stop: 20 21:07


Lactated Ringer's (Ringers, Lactated)  1,000 mls @ 150 mls/hr IV ASDIRECTED CHRISTIAN


   Last Admin: 20 02:21 Dose:  150 mls/hr


Oxytocin/Sodium Chloride (Oxytocin 30 Unit/500 Ml-Ns)  30 unit in 500 mls @ 999 

mls/hr IV TITRATE CHRISTIAN


Oxytocin/Sodium Chloride (Oxytocin 30 Unit/500 Ml-Ns)  30 unit in 500 mls @ 2 

mls/hr IV TITRATE CHRISTIAN; Protocol


   Last Titration: 20 04:14 Dose:  10 munits/min, 10 mls/hr


Tranexamic Acid 1,000 mg/ (Sodium Chloride)  110 mls @ 660 mls/hr IV ONETIME PRN


   PRN Reason: Bleeding


Fentanyl/Bupivacaine HCl (Fentanyl/Bupivacaine/Ns 2 Mcg-0.125% 250 Ml) Confirm 

Administered Dose 250 mls @ as directed .ROUTE .STK-MED ONE


   Stop: 20 21:07


Lidocaine HCl (Xylocaine 1%)  50 ml INJECT ONETIME PRN


   PRN Reason: Laceration repair


Methylergonovine Maleate (Methergine)  0.2 mg IM ASDIRECTED PRN


   PRN Reason: Post Partum Hemorrhage


Misoprostol (Cytotec)  200 mcg PO ONETIME PRN


   PRN Reason: Post Partum Hemorrhage


Misoprostol (Cytotec)  25 mcg VAG Q4H PRN


   PRN Reason: Cervical Ripening


   Last Admin: 20 10:37 Dose:  25 mcg


Misoprostol (Cytotec)  25 mcg PO Q4H PRN


   PRN Reason: Cervical Ripening


   Last Admin: 20 10:37 Dose:  25 mcg


Nalbuphine HCl (Nubain)  10 mg IVPUSH Q1H PRN


   PRN Reason: Pain (severe 7-10)


Ondansetron HCl (Zofran)  4 mg IVPUSH Q6H PRN


   PRN Reason: Nausea/Vomiting


Sodium Chloride (Saline Flush)  10 ml FLUSH ASDIRECTED PRN


   PRN Reason: Keep Vein Open


Sodium Chloride (Saline Flush)  2.5 ml FLUSH ASDIRECTED PRN


   PRN Reason: Keep Vein Open


Sodium Chloride (Normal Saline)  10 ml IV ASDIRECTED PRN


   PRN Reason: IV Use


Sterile Water (Sterile Water For Irrigation)  1,000 ml IRR ASDIRECTED PRN


   PRN Reason: delivery


Terbutaline Sulfate (Brethine)  0.25 mg SUBCUT ASDIRECTED PRN


   PRN Reason: Tacysystole











- Infant Interaction


Infant Disposition, Postpartum:  in Room with Family


Infant Interaction: Unable to Hold Infant at this Time


Infant Feeding: Attempted Breastfeeding; Nursed Fair/Poor


Support Person: Significant Other





- Postpartum Recovery Exam


Fundal Tone: Firm


Fundal Level: 1 Fingerbreadths Below Umbilicus


Fundal Placement: Midline


Lochia Amount: Small


Lochia Color: Rubra/Red


Perineum Description: Intact, Minimal Bruising/Swelling


Episiotomy/Laceration: None


Bladder Status: Voiding


Urinary Elimination: Voided





- Exam


General: Alert, Oriented


HEENT: Pupils Equal


Neck: Supple


Lungs: Clear to Auscultation, Normal Respiratory Effort


Cardiovascular: Regular Rate, Regular Rhythm


GI/Abdominal Exam: Normal Bowel Sounds, Soft, Non-Tender, No Organomegaly, No 

Distention, No Abnormal Bruit, No Mass, Pelvis Stable


Extremities: Normal Inspection, Normal Range of Motion, Non-Tender, No Pedal 

Edema, Normal Capillary Refill


Skin: Warm, Dry, Intact


Wound/Incisions: Healing Well


Neurological: No New Focal Deficit


Psy/Mental Status: Alert, Normal Affect, Normal Mood





- Problem List Review


Problem List Initiated/Reviewed/Updated: Yes





- My Orders


Last 24 Hours: 


My Active Orders





20 10:32


ANTIBODY SCREEN (REGINALD) [BBK] Routine 


FETAL SCREEN [BBK] Routine 


RH IMMUNE GLOBULIN [BBK] Routine 


RHOGAM, POSTPARTUM [RHIG WORKUP, POSTPARTUM] [BBK] Routine 














- Assessment


Assessment:: 





Status post normal spontaneous vaginal delivery patient is doing well she will 

be sent home today





- Plan


Plan:: 





IOL


A: 23 yo  admit for elective IOL at 40 +0 weeks; A-, Rubella non-immune, 

GBS negative, SVE 2/-3 per nurse report


P: Admit, induction of labor, anticipate , Dr. Corcoran updated





Admit


A:  viable male, APGARs 8/9, weight pending.  Placenta delivered grossly 

intact, 3VC,  mL.  Intact perineum. Fundus firm.


P: Routine postpartum plan of care.  Dr. Corcoran updated.

## 2020-03-24 NOTE — PCM.DCSUM1
**Discharge Summary





- Hospital Course


Diagnosis: Stroke: No





- Discharge Data


Discharge Date: 03/24/20


Discharge Disposition: Home, Self-Care 01


Condition: Good





- Referral to Home Health


Primary Care Physician: 


Constantino Corcoran MD








- Patient Instructions


Diet: Usual Diet as Tolerated


Activity: As Tolerated


Driving: Do Not Drive


Showering/Bathing: May Shower





- Discharge Plan


Home Medications: 


 Home Meds





Pnv No.95/Ferrous Fum/Folic AC [Prenatal Caplet] 1 each PO DAILY 09/03/19 [

History]








Referrals: 


[facility] [Outside]


Delfina Rubi CNM, NP [Mid-Wife] - 05/04/20 2:00 pm





- Discharge Summary/Plan Comment


DC Time >30 min.: Yes





- General Info


Date of Service: 03/24/20


Functional Status: Reports: Pain Controlled





- Review of Systems


General: Reports: No Symptoms


HEENT: Reports: No Symptoms


Pulmonary: Reports: No Symptoms


Cardiovascular: Reports: No Symptoms


Gastrointestinal: Reports: No Symptoms


Genitourinary: Reports: No Symptoms


Musculoskeletal: Reports: No Symptoms


Skin: Reports: No Symptoms


Neurological: Reports: No Symptoms


Psychiatric: Reports: No Symptoms





- Patient Data


Vitals - Most Recent: 


 Last Vital Signs











Temp  36.1 C   03/24/20 04:00


 


Pulse  61   03/24/20 04:00


 


Resp  16   03/24/20 04:00


 


BP  101/64   03/24/20 04:00


 


Pulse Ox  100   03/24/20 04:00











Weight - Most Recent: 70.307 kg


Lab Results - Last 24 hrs: 


 Laboratory Results - last 24 hr











  03/23/20 03/24/20 Range/Units





  10:32 05:58 


 


Hgb   9.9 L  (12.0-16.0)  g/dL


 


Hct   29.6 L  (36.0-46.0)  %


 


Antibody Screen  NEGATIVE   


 


Fetal Screen  NEGATIVE   (NEGATIVE)  


 


RhIG Candidate?  YES   


 


Rhogam Indicated  YES, BABY RH POS H   











Med Orders - Current: 


 Current Medications





Acetaminophen (Tylenol Extra Strength)  500 mg PO Q4H PRN


   PRN Reason: Pain


Acetaminophen (Tylenol Extra Strength)  1,000 mg PO Q4H PRN


   PRN Reason: Pain


   Last Admin: 03/23/20 22:14 Dose:  1,000 mg


Benzocaine/Menthol (Dermoplast Pain Relief 20%-0.5% Spray)  78 gm TOP 

ASDIRECTED PRN


   PRN Reason: Perineal Comfort Measure


   Last Admin: 03/23/20 12:23 Dose:  1 can


Bisacodyl (Dulcolax)  10 mg RECTAL ONETIME PRN


   PRN Reason: Constipation


Docusate Sodium (Colace)  100 mg PO BID PRN


   PRN Reason: Constipation


Emollient Ointment (Lansinoh Hpa)  0 gm TOP ASDIRECTED PRN


   PRN Reason: Sore Nipples


Ibuprofen (Motrin)  400 mg PO Q4H PRN


   PRN Reason: Pain


Ibuprofen (Motrin)  800 mg PO Q6H PRN


   PRN Reason: Pain


   Last Admin: 03/23/20 20:18 Dose:  800 mg


Oxycodone HCl (Oxycodone)  5 mg PO Q2H PRN


   PRN Reason: Pain


   Last Admin: 03/23/20 22:14 Dose:  5 mg


Witch Hazel (Tucks)  1 pad TOP ASDIRECTED PRN


   PRN Reason: comfort care


   Last Admin: 03/23/20 12:24 Dose:  1 tub





Discontinued Medications





Butorphanol Tartrate (Stadol)  1 mg IVPUSH Q1H PRN


   PRN Reason: Pain


   Last Admin: 03/22/20 19:04 Dose:  1 mg


Carboprost Tromethamine (Hemabate Ds)  250 mcg IM ASDIRECTED PRN


   PRN Reason: Post Partum Hemorrhage


Fentanyl (Sublimaze) Confirm Administered Dose 100 mcg .ROUTE .K-MED ONE


   Stop: 03/22/20 21:07


Lactated Ringer's (Ringers, Lactated)  1,000 mls @ 150 mls/hr IV ASDIRECTED CHRISTIAN


   Last Admin: 03/23/20 02:21 Dose:  150 mls/hr


Oxytocin/Sodium Chloride (Oxytocin 30 Unit/500 Ml-Ns)  30 unit in 500 mls @ 999 

mls/hr IV TITRATE CHRISTIAN


Oxytocin/Sodium Chloride (Oxytocin 30 Unit/500 Ml-Ns)  30 unit in 500 mls @ 2 

mls/hr IV TITRATE CHRISTIAN; Protocol


   Last Titration: 03/23/20 04:14 Dose:  10 munits/min, 10 mls/hr


Tranexamic Acid 1,000 mg/ (Sodium Chloride)  110 mls @ 660 mls/hr IV ONETIME PRN


   PRN Reason: Bleeding


Fentanyl/Bupivacaine HCl (Fentanyl/Bupivacaine/Ns 2 Mcg-0.125% 250 Ml) Confirm 

Administered Dose 250 mls @ as directed .ROUTE .STK-MED ONE


   Stop: 03/22/20 21:07


Lidocaine HCl (Xylocaine 1%)  50 ml INJECT ONETIME PRN


   PRN Reason: Laceration repair


Methylergonovine Maleate (Methergine)  0.2 mg IM ASDIRECTED PRN


   PRN Reason: Post Partum Hemorrhage


Misoprostol (Cytotec)  200 mcg PO ONETIME PRN


   PRN Reason: Post Partum Hemorrhage


Misoprostol (Cytotec)  25 mcg VAG Q4H PRN


   PRN Reason: Cervical Ripening


   Last Admin: 03/22/20 10:37 Dose:  25 mcg


Misoprostol (Cytotec)  25 mcg PO Q4H PRN


   PRN Reason: Cervical Ripening


   Last Admin: 03/22/20 10:37 Dose:  25 mcg


Nalbuphine HCl (Nubain)  10 mg IVPUSH Q1H PRN


   PRN Reason: Pain (severe 7-10)


Ondansetron HCl (Zofran)  4 mg IVPUSH Q6H PRN


   PRN Reason: Nausea/Vomiting


Sodium Chloride (Saline Flush)  10 ml FLUSH ASDIRECTED PRN


   PRN Reason: Keep Vein Open


Sodium Chloride (Saline Flush)  2.5 ml FLUSH ASDIRECTED PRN


   PRN Reason: Keep Vein Open


Sodium Chloride (Normal Saline)  10 ml IV ASDIRECTED PRN


   PRN Reason: IV Use


Sterile Water (Sterile Water For Irrigation)  1,000 ml IRR ASDIRECTED PRN


   PRN Reason: delivery


Terbutaline Sulfate (Brethine)  0.25 mg SUBCUT ASDIRECTED PRN


   PRN Reason: Tacysystole











- Exam


General: Reports: Alert, Oriented


HEENT: Reports: Pupils Equal, Pupils Reactive, EOMI, Mucous Membr. Moist/Pink


Neck: Reports: Supple


Lungs: Reports: Clear to Auscultation, Normal Respiratory Effort


Cardiovascular: Reports: Regular Rate, Regular Rhythm


GI/Abdominal Exam: Normal Bowel Sounds, Soft, Non-Tender, No Organomegaly, No 

Distention, No Abnormal Bruit, No Mass, Pelvis Stable


 (Female) Exam: Normal External Exam, Normal Speculum Exam, Normal Bimanual 

Exam


Rectal (Female) Exam: Normal Exam, Normal Rectal Tone


Back Exam: Reports: Normal Inspection, Full Range of Motion


Extremities: Normal Inspection, Normal Range of Motion, Non-Tender, No Pedal 

Edema, Normal Capillary Refill


Skin: Reports: Warm, Dry, Intact


Wound/Incisions: Reports: Healing Well


Neurological: Reports: No New Focal Deficit


Psy/Mental Status: Reports: Alert, Normal Affect, Normal Mood

## 2021-10-10 NOTE — PCM.PREANE
Preanesthetic Assessment





- Anesthesia/Transfusion/Family Hx


Anesthesia History: Prior Anesthesia Without Reaction


Family History of Anesthesia Reaction: No


Transfusion History: No Prior Transfusion(s)


Intubation History: Unknown





- Review of Systems


General: No Symptoms


Pulmonary: No Symptoms, Other (Former Smoker 3 years ago)


Cardiovascular: No Symptoms


Gastrointestinal: No Symptoms, Other (GERD with Pregnancy)


Neurological: No Symptoms


Other: Reports: None





- Physical Assessment


NPO Status Date: 10/10/21


NPO Status Time: 15:00


Height: 1.6 m


Weight: 72.575 kg


ASA Class: 2


Mental Status: Alert & Oriented x3


Airway Class: Mallampati = 2


Dentition: Reports: Normal Dentition


Thyro-Mental Finger Breadths: 3


Mouth Opening Finger Breadths: 3


ROM/Head Extension: Full


Lungs: Clear to Auscultation, Normal Respiratory Effort


Cardiovascular: Regular Rate, Regular Rhythm





- Lab


Values: 





                             Laboratory Last Values











WBC  7.57 K/uL (4.0-11.0)   10/10/21  05:15    


 


RBC  3.81 M/uL (4.30-5.90)  L  10/10/21  05:15    


 


Hgb  12.5 g/dL (12.0-16.0)   10/10/21  05:15    


 


Hct  36.2 % (36.0-46.0)   10/10/21  05:15    


 


MCV  95.0 fL (80.0-98.0)   10/10/21  05:15    


 


MCH  32.8 pg (27.0-32.0)  H  10/10/21  05:15    


 


MCHC  34.5 g/dL (31.0-37.0)   10/10/21  05:15    


 


RDW Std Deviation  45.2 fl (28.0-62.0)   10/10/21  05:15    


 


RDW Coeff of Shante  13 % (11.0-15.0)   10/10/21  05:15    


 


Plt Count  219 K/uL (150-400)   10/10/21  05:15    


 


MPV  11.10 fL (7.40-12.00)   10/10/21  05:15    


 


Nucleated RBC %  0.0 /100WBC  10/10/21  05:15    


 


Nucleated RBCs #  0 K/uL  10/10/21  05:15    


 


SARS-CoV-2 RNA (JAME)  NEGATIVE  (NEGATIVE)   10/10/21  05:30    


 


Blood Type  A NEGATIVE   10/10/21  05:15    


 


Antibody Screen  POSITIVE   10/10/21  05:15    


 


Antibody Identification  Anti-D   10/10/21  05:15    














- Allergies


Allergies/Adverse Reactions: 


                                    Allergies











Allergy/AdvReac Type Severity Reaction Status Date / Time


 


No Known Allergies Allergy   Verified 10/10/21 06:22














- Blood


Blood Available: Yes


Product(s) Available: PRBC (Type and Screen)





- Anesthesia Plan


Pre-Op Medication Ordered: None





- Acknowledgements


Anesthesia Type Planned: Epidural


Pt an Appropriate Candidate for the Planned Anesthesia: Yes


Alternatives and Risks of Anesthesia Discussed w Pt/Guardian: Yes


Pt/Guardian Understands and Agrees with Anesthesia Plan: Yes


Additional Comments: 





Pt denies neurological disease, coagulopathy, use of blood thinners.  





PreAnesthesia Questionnaire





- Past Health History


Medical/Surgical History: Denies Medical/Surgical History


HEENT History: Reports: None


Cardiovascular History: Reports: None


Respiratory History: Reports: None


Gastrointestinal History: Reports: None


Genitourinary History: Reports: None


OB/GYN History: Reports: Pregnancy, Other (See Below) ()


Musculoskeletal History: Reports: None


Neurological History: Reports: None


Psychiatric History: Reports: Anxiety, Depression


Endocrine/Metabolic History: Reports: None


Hematologic History: Reports: None


Immunologic History: Reports: None


Oncologic (Cancer) History: Reports: None


Dermatologic History: Reports: None





- Infectious Disease History


Infectious Disease History: Reports: None





- Past Surgical History


Head Surgeries/Procedures: Reports: None





- HOME MEDS


Home Medications: 


                                    Home Meds





Pnv No.95/Ferrous Fum/Folic AC [Prenatal Caplet] 1 each PO DAILY 19 

[History]











- CURRENT (IN HOUSE) MEDS


Current Meds: 





                               Current Medications





Acetaminophen (Acetaminophen 325 Mg Tab)  650 mg PO Q4H PRN


   PRN Reason: mild pain and fever


Butorphanol Tartrate (Butorphanol 1 Mg/Ml Sdv)  1 mg IVPUSH Q1H PRN


   PRN Reason: Pain (severe 7-10)


Carboprost Tromethamine (Carboprost Tromethamine 250 Mcg/1 Ml Amp)  250 mcg IM 

ASDIRECTED PRN


   PRN Reason: Post Partum Hemorrhage


Hydroxyzine HCl (Hydroxyzine Hcl 25 Mg Tab)  50 mg PO Q6H PRN


   PRN Reason: Itching


Oxytocin/Sodium Chloride (Oxytocin 30 Unit In Ns 0.9% 500 Ml Premix)  30 unit in

 500 mls @ 2 mls/hr IV TITRATE CHRISTIAN; Protocol


Lactated Ringer's (Ringers, Lactated)  1,000 mls @ 150 mls/hr IV ASDIRECTED CHRISTIAN


   Last Admin: 10/10/21 16:10 Dose:  999 mls/hr


   Documented by: 


Oxytocin/Sodium Chloride (Oxytocin 30 Unit In Ns 0.9% 500 Ml Premix)  30 unit in

 500 mls @ 999 mls/hr IV TITRATE CHRISTIAN


Tranexamic Acid 1,000 mg/ (Sodium Chloride)  110 mls @ 660 mls/hr IV ONETIME PRN


   PRN Reason: Bleeding


Lidocaine HCl (Lidocaine 1% 50 Ml Mdv)  50 ml INJECT ONETIME PRN


   PRN Reason: Laceration repair


Methylergonovine Maleate (Methylergonovine 0.2 Mg/1 Ml Amp)  0.2 mg IM 

ASDIRECTED PRN


   PRN Reason: Post Partum Hemorrhage


Misoprostol (Misoprostol 25 Mcg (1/4 Of 100 Mcg) Tab)  25 mcg PO Q4HR [facility]


   Last Admin: 10/10/21 06:21 Dose:  25 mcg


   Documented by: 


Misoprostol (Misoprostol 25 Mcg (1/4 Of 100 Mcg) Tab)  25 mcg VAG Q4H PRN


   PRN Reason: Cervical Ripening


   Last Admin: 10/10/21 06:21 Dose:  25 mcg


   Documented by: 


Misoprostol (Misoprostol 200 Mcg Tab)  200 mcg PO ONETIME PRN


   PRN Reason: Post Partum Hemorrhage


Nalbuphine HCl (Nalbuphine 10 Mg/1 Ml Vial)  10 mg IVPUSH Q1H PRN


   PRN Reason: Pain (severe 7-10)


Ondansetron HCl (Ondansetron 4 Mg/2 Ml Sdv)  4 mg IVPUSH Q4H PRN


   PRN Reason: Nausea/Vomiting


Sodium Chloride (Sodium Chloride 0.9% 10 Ml Syringe)  10 ml FLUSH ASDIRECTED PRN


   PRN Reason: Keep Vein Open


Sodium Chloride (Sodium Chloride 0.9% 2.5 Ml Syringe)  2.5 ml FLUSH ASDIRECTED 

PRN


   PRN Reason: Keep Vein Open


Sodium Chloride (Sodium Chloride 0.9% 10 Ml Sdv)  10 ml IV ASDIRECTED PRN


   PRN Reason: IV Use


Sterile Water (Water For Irrigation,Sterile 1,000 Ml Container)  1,000 ml IRR 

ASDIRECTED PRN


   PRN Reason: delivery


Terbutaline Sulfate (Terbutaline 1 Mg/Ml Sdv)  0.25 mg SUBCUT ASDIRECTED PRN


   PRN Reason: Tacysystole





Discontinued Medications





Ropivacaine (Naropin 0.2%) Confirm Administered Dose 200 mls @ as directed 

.[facility]


   Stop: 10/10/21 17:07

## 2021-10-10 NOTE — PCM.SN.2
Time Documentation











- Pre-Procedure Checklist


Attending Provider Aware: Yes


Chart Reviewed: Yes


Consent Signed: Yes


Labs Reviewed: Yes


VS/FHR Reviewed: Yes


Patient Identification Confirmation Method: Reports: Chart Visual, Verbal 

Patient


Pt an Appropriate Candidate for the Planned Anesthesia: Yes


Alternatives and Risks of Anesthesia Discussed w Pt/Guardian: Yes





- Procedure


Procedure Start Date: 10/10/21


Procedure Start Time: 16:48


Monitors in Place: Reports: Blood Pressure, Heart Rate, SPO2


Functional IV: Yes


Bolus Infused (fluid type and amount): 1000ml LR


Safety Measures: Reports: Patient Identified, Procedure Verified, Site Verified,

Procedure Time Out


Patient Position: Reports: Sitting


Prep: Reports: Betadine x3


Local Anesthetic: Reports: Intradermal Wheal w Lidocaine 1%, Other (3 ML)


Regional Placement Level: Reports: L3-4


Needle: Reports: 17 g Touhy


Approach: Reports: Midline


Technique: Reports: BILL Glass Syringe


BILL Needle Depth (cm): 6 cm


Parasthesia: Reports: None


Fluid Obtained: Reports: None


Catheter Depth at Skin (cm): 15 cm


Test Dose Time: 17:01


Test Dose Medication: Reports: Lidocaine 1.5% w Epinephrine 1:200,000


Test Dose Response: Reports: Negative


Loading Dose Time: 17:14


Loading Dose Medication: 0.2% Ropivicaine 6 ML


Loading Dose Patient Position: Supine


Continuous Infusion Start Time: 17:15


Continuous Infusion Medication: Ropivicaine 0.2%


Continuous Infusion Rate: 10


Continuous Infusion PCS Bolus Option: 4


Continuous Infusion Lockout Dose (cc/hr): 20


Patient Position Post Placement: Reports: Supline/GRETCHEN


Post-procedure Pain Level: 3


Level Achieved: T4


VS and FHR Monitored in Unit Post Placement: Yes


Procedure End Date: 10/10/21


Procedure End Time: 17:15


Procedure Comment: Sterile technique used throughout.

## 2021-10-10 NOTE — PCM.DEL
L & D Note





- General Info


Date of Service: 10/10/21


Mother's Due Date: 10/08/21





- Delivery Note


Labor: Augmented by ARM, Augmented by Oxytocin


Cervical Ripening Method: Misoprostil


Delivery Outcome: Livebirth


Infant Delivery Method: Spontaneous Vaginal Delivery-Single


Infant Delivery Mode: Spontaneous


Birth Presentation: Left Occiput Anterior (ALEXANDRA)


Nuchal Cord: None


Anesthesia Type: Epidural


Amniotic Fluid Description: Clear


Episiotomy Type: None


Laceration: None


Placenta: Intact, Spontaneous


Cord: 3 Vessels


Estimated Blood Loss: 300


Resuscitation Needed: Yes


: Stimulated, Warmed, Thomson Used


APGAR Score 1 min: 8


APGAR Score 5 min: 9


Second Stage Interventions: Reports: Encouragement Given, Pushing Effectively, 

Pushing, Pulls Own Legs Back


Delivery Comments (Free Text/Narrative):: 


Puja is a 27 yo current  at 40+2 weeks gestation (SAURABH(LMP) 10/8/2021) 

immediately s/p  following elective IOL with misoprostol cervical ripening 

and AROM + pitocin augmentation.  A neg, Ab screen neg, RI, GBS neg.  

Prophylactic RhoGam adminiseed 2021.  BLE epidural in place, pain well 

controlled.  Fetal head birthed easily ALEXANDRA with body immediately following with 

next push.  NBF placed to maternal abdomen skin to skin, warmed, dried, 

stimulated with spontaneous cries immediately following birth.  IV pitocin bolus

started for active third stage management.  Placenta birthed ~11 min s/p NBF, 

intact, Felix, 3VC.  Uterus firm U-1, scant to small rubra lochia, no clots.  

Perineum intact.  APGARS 8/9.  Birth weight 7 lb 0 oz (3170 g).








- General Info


Date of Service: 10/10/21


Admission Dx/Problem (Free Text): 


                   Patient Status Order with Admit Dx/Problem





10/10/21 05:45


Patient Status [ADT] Routine 





10/10/21 06:53


Admission Status [Patient Status] [ADT] Routine 








                           Admission Diagnosis/Problem











Admission Diagnosis/Problem    Pregnancy














10/10/21 12:02


Puja is a 27 yo  at 40+2 weeks gestation (SAURABH(LMP) 10/8/2021) that presents

 today for elective IOL, RBAs including cervical ripening techniques  discussed 

and consents signed in office 10/5/2021 with Delfina Rubi CNM.  A neg, Ab 

screen neg, RI, GBS neg.  Prophylactic RhoGam adminiseed 2021. Reports + 

FM; denies contractions, LOF, vaginal bleeding at this time.  Unremarkable 

prenatal course.  Patient confirmed vertex via SVE, confirms she continues to 

desire elective IOL.


Functional Status: Reports: Pain Controlled





- Review of Systems


General: Reports: No Symptoms


HEENT: Reports: No Symptoms


Pulmonary: Reports: No Symptoms


Cardiovascular: Reports: No Symptoms


Gastrointestinal: Reports: No Symptoms


Genitourinary: Reports: No Symptoms


Musculoskeletal: Reports: No Symptoms


Skin: Reports: No Symptoms


Neurological: Reports: No Symptoms


Psychiatric: Reports: No Symptoms





- Patient Data


Vitals - Most Recent: 


VSS, afebrile.  See  flowsheet.


Weight - Most Recent: 160 lb


Lab Results Last 24 Hours: 


                         Laboratory Results - last 24 hr











  10/10/21 10/10/21 10/10/21 Range/Units





  05:15 05:15 05:30 


 


WBC  7.57    (4.0-11.0)  K/uL


 


RBC  3.81 L    (4.30-5.90)  M/uL


 


Hgb  12.5    (12.0-16.0)  g/dL


 


Hct  36.2    (36.0-46.0)  %


 


MCV  95.0    (80.0-98.0)  fL


 


MCH  32.8 H    (27.0-32.0)  pg


 


MCHC  34.5    (31.0-37.0)  g/dL


 


RDW Std Deviation  45.2    (28.0-62.0)  fl


 


RDW Coeff of Shante  13    (11.0-15.0)  %


 


Plt Count  219    (150-400)  K/uL


 


MPV  11.10    (7.40-12.00)  fL


 


Nucleated RBC %  0.0    /100WBC


 


Nucleated RBCs #  0    K/uL


 


SARS-CoV-2 RNA (JAME)    NEGATIVE  (NEGATIVE)  


 


Blood Type   A NEGATIVE   


 


Antibody Screen   POSITIVE   


 


Antibody Identification   Anti-D   











Med Orders - Current: 


                               Current Medications








Discontinued Medications





Acetaminophen (Acetaminophen 325 Mg Tab)  650 mg PO Q4H PRN


   PRN Reason: mild pain and fever


Butorphanol Tartrate (Butorphanol 1 Mg/Ml Sdv)  1 mg IVPUSH Q1H PRN


   PRN Reason: Pain (severe 7-10)


Carboprost Tromethamine (Carboprost Tromethamine 250 Mcg/1 Ml Amp)  250 mcg IM 

ASDIRECTED PRN


   PRN Reason: Post Partum Hemorrhage


Ephedrine Sulfate (Ephedrine 50 Mg/Ml Sdv)  10 mg IVPUSH Q1M PRN


   PRN Reason: Hypotension


Hydroxyzine HCl (Hydroxyzine Hcl 25 Mg Tab)  50 mg PO Q6H PRN


   PRN Reason: Itching


Oxytocin/Sodium Chloride (Oxytocin 30 Unit In Ns 0.9% 500 Ml Premix)  30 unit in

 500 mls @ 2 mls/hr IV TITRATE CHRISTIAN; Protocol


   Last Titration: 10/10/21 22:19 Dose:  8 munits/min, 8 mls/hr


   Documented by: 


Lactated Ringer's (Ringers, Lactated)  1,000 mls @ 150 mls/hr IV ASDIRECTED CHRISTIAN


   Last Admin: 10/10/21 20:21 Dose:  150 mls/hr


   Documented by: 


Oxytocin/Sodium Chloride (Oxytocin 30 Unit In Ns 0.9% 500 Ml Premix)  30 unit in

 500 mls @ 999 mls/hr IV TITRATE CHRISTIAN


Tranexamic Acid 1,000 mg/ (Sodium Chloride)  110 mls @ 660 mls/hr IV ONETIME PRN


   PRN Reason: Bleeding


Ropivacaine (Naropin 0.2%) Confirm Administered Dose 200 mls @ as directed 

.ROUTE .[facility] ONE


   Stop: 10/10/21 17:07


Lidocaine HCl (Lidocaine 1% 50 Ml Mdv)  50 ml INJECT ONETIME PRN


   PRN Reason: Laceration repair


Methylergonovine Maleate (Methylergonovine 0.2 Mg/1 Ml Amp)  0.2 mg IM 

ASDIRECTED PRN


   PRN Reason: Post Partum Hemorrhage


Miscellaneous Medication (Phenylephrine Hcl In 0.9% Nacl 1 Mg/10 Ml Syringe)  

0.1 mg IVPUSH Q1M PRN


   PRN Reason: Hypotension


Misoprostol (Misoprostol 25 Mcg (1/4 Of 100 Mcg) Tab)  25 mcg PO Q4HR CHRISTIAN


   Last Admin: 10/10/21 06:21 Dose:  25 mcg


   Documented by: 


Misoprostol (Misoprostol 25 Mcg (1/4 Of 100 Mcg) Tab)  25 mcg VAG Q4H PRN


   PRN Reason: Cervical Ripening


   Last Admin: 10/10/21 06:21 Dose:  25 mcg


   Documented by: 


Misoprostol (Misoprostol 200 Mcg Tab)  200 mcg PO ONETIME PRN


   PRN Reason: Post Partum Hemorrhage


Nalbuphine HCl (Nalbuphine 10 Mg/1 Ml Vial)  10 mg IVPUSH Q1H PRN


   PRN Reason: Pain (severe 7-10)


Ondansetron HCl (Ondansetron 4 Mg/2 Ml Sdv)  4 mg IVPUSH Q4H PRN


   PRN Reason: Nausea/Vomiting


Ropivacaine (Ropivacaine/Pf 400 Mg/200 Ml Pca)  400 mg EPIDUR ASDIRECTED CHRISTIAN


Sodium Chloride (Sodium Chloride 0.9% 10 Ml Syringe)  10 ml FLUSH ASDIRECTED PRN


   PRN Reason: Keep Vein Open


Sodium Chloride (Sodium Chloride 0.9% 2.5 Ml Syringe)  2.5 ml FLUSH ASDIRECTED 

PRN


   PRN Reason: Keep Vein Open


Sodium Chloride (Sodium Chloride 0.9% 10 Ml Sdv)  10 ml IV ASDIRECTED PRN


   PRN Reason: IV Use


Sterile Water (Water For Irrigation,Sterile 1,000 Ml Container)  1,000 ml IRR 

ASDIRECTED PRN


   PRN Reason: delivery


Terbutaline Sulfate (Terbutaline 1 Mg/Ml Sdv)  0.25 mg SUBCUT ASDIRECTED PRN


   PRN Reason: Tacysystole











- Exam


General: Alert, Oriented, Cooperative, No Acute Distress


HEENT: Pupils Equal, Mucous Membr. Moist/Pink


Neck: Supple


Lungs: Clear to Auscultation, Normal Respiratory Effort


Cardiovascular: Regular Rate, Regular Rhythm


GI/Abdominal Exam: Normal Bowel Sounds, Soft, Non-Tender, No Organomegaly, No 

Distention


 (Female) Exam: Normal External Exam, Enlarged Uterus (Postpartum uterus, firm

 U-1), Vaginal Bleeding (Scant to small rubra lochia, no clots.)


Back Exam: Normal Inspection, Full Range of Motion


Extremities: Normal Inspection, Normal Range of Motion, Non-Tender, No Pedal 

Edema, Normal Capillary Refill


Skin: Warm, Dry, Intact


Neurological: No New Focal Deficit (BLE epidural intact, pain well controlled.)


Psy/Mental Status: Alert, Normal Affect, Normal Mood





- Problem List & Annotations


(1)  (spontaneous vaginal delivery)


SNOMED Code(s): 247679816


   Code(s): O80 - ENCOUNTER FOR FULL-TERM UNCOMPLICATED DELIVERY   Status: Acute

   Priority: High   Current Visit: Yes   





(2) Lactating mother


SNOMED Code(s): 827220528, 902888307


   Code(s): Z39.1 - ENCOUNTER FOR CARE AND EXAMINATION OF LACTATING MOTHER   

Status: Acute   Priority: High   Current Visit: Yes   





- Problem List Review


Problem List Initiated/Reviewed/Updated: Yes





- My Orders


Last 24 Hours: 


My Active Orders





10/10/21 Dinner


Regular Diet [DIET] 





10/10/21 23:34


Patient Status [ADT] Routine 


May Shower [RC] ASDIRECTED 


Up ad Tri [RC] ASDIRECTED 


Vital Signs [RC] PER UNIT ROUTINE 


Acetaminophen [Tylenol Extra Strength]   1,000 mg PO Q4H PRN 


Acetaminophen [Tylenol Extra Strength]   500 mg PO Q4H PRN 


Benzocaine/Menthol [Dermoplast Pain Relief 20%-0.5% Spray]   78 gm TOP 

ASDIRECTED PRN 


Docusate Sodium [Colace]   100 mg PO Q12H PRN 


Ibuprofen [Motrin]   400 mg PO Q4H PRN 


Ibuprofen [Motrin]   800 mg PO Q6H PRN 


Lanolin [Lansinoh HPA]   See Dose Instructions  TOP ASDIRECTED PRN 


bisacodyL [Dulcolax]   10 mg RECTAL ONETIME PRN 


oxyCODONE   5 mg PO Q2H PRN 


witch hazeL [Tucks]   1 pad TOP ASDIRECTED PRN 


Assess Lochia [WOMSER] Per Unit Routine 


Assess Uterine Involution [WOMSER] Per Unit Routine 


Ice Therapy [OM.PC] Per Unit Routine 


Perineal Care [OM.PC] Per Unit Routine 


Peripheral IV Discontinue [OM.PC] Routine 


Sitz Bath [OM.PC] Per Unit Routine 


Resuscitation Status Routine 





10/10/21 23:35


Cooling Warming Measures [RC] ASDIRECTED 














- Plan


Plan:: 


Admit to inpatient postpartum unit immediately s/p  of viable, term  

following elective IOL with misoprostol cervical ripening and AROM+pitocin 

augmentation.  Resume regular diet.  D/C epidural now.  May ambulate within 2-4 

hours with assistance once sensation returns.  If patient has not voided within 

6 hours, may I&O cath once and call provider.  See new orders. Dr. Corcoran 

notified and agreeable with POC.

## 2021-10-10 NOTE — PCM.POSTAN
POST ANESTHESIA ASSESSMENT





- MENTAL STATUS


Mental Status: Alert, Oriented





- RESPIRATORY


Respiratory Status: Respiratory Rate WNL, Airway Patent, O2 Saturation Stable





- CARDIOVASCULAR


CV Status: Pulse Rate WNL, Blood Pressure Stable





- GASTROINTESTINAL


GI Status: No Symptoms





- PAIN


Pain Score: 3





- POST OP HYDRATION


Hydration Status: Adequate & Stable

## 2021-10-10 NOTE — PCM.LDHP
L&D History of Present Illness





- General


Date of Service: 10/10/21


Admit Problem/Dx: 


                   Patient Status Order with Admit Dx/Problem





10/10/21 05:45


Patient Status [ADT] Routine 





10/10/21 06:53


Admission Status [Patient Status] [ADT] Routine 








                           Admission Diagnosis/Problem











Admission Diagnosis/Problem    Pregnancy














10/10/21 12:02


Puja is a 27 yo  at 40+2 weeks gestation (SAURABH(LMP) 10/8/2021) that presents

 today for elective IOL, RBAs including cervical ripening techniques  discussed 

and consents signed in office 10/5/2021 with Delfina Rubi CNM.  A neg, Ab 

screen neg, RI, GBS neg.  Prophylactic RhoGam adminiseed 2021. Reports + 

FM; denies contractions, LOF, vaginal bleeding at this time.  Unremarkable pre

 course.  Patient confirmed vertex via SVE, confirms she continues to 

desire elective IOL.


Source of Information: Patient


History Limitations: Reports: No Limitations





- Related Data


Allergies/Adverse Reactions: 


                                    Allergies











Allergy/AdvReac Type Severity Reaction Status Date / Time


 


No Known Allergies Allergy   Verified 10/10/21 06:22











Home Medications: 


                                    Home Meds





Pnv No.95/Ferrous Fum/Folic AC [Prenatal Caplet] 1 each PO DAILY 19 

[History]











Past Medical History





- Past Health History


Medical/Surgical History: Denies Medical/Surgical History


HEENT History: Reports: None


Cardiovascular History: Reports: None


Respiratory History: Reports: None


Gastrointestinal History: Reports: None


Genitourinary History: Reports: None


OB/GYN History: Reports: Pregnancy, Other (See Below) ()


: 2


Para: 1


LMP (Approximate): Pregnant


Musculoskeletal History: Reports: None


Neurological History: Reports: None


Psychiatric History: Reports: Anxiety, Depression


Endocrine/Metabolic History: Reports: None


Hematologic History: Reports: None


Immunologic History: Reports: None


Oncologic (Cancer) History: Reports: None


Dermatologic History: Reports: None





- Infectious Disease History


Infectious Disease History: Reports: None





- Past Surgical History


Head Surgeries/Procedures: Reports: None





Social & Family History





- Family History


Family Medical History: No Pertinent Family History


Psychiatric: Reports: Other (See Below) (ETOH abuse)





- Caffeine Use


Caffeine Use: Reports: None





H&P Review of Systems





- Review of Systems:


Review Of Systems: Comprehensive ROS is negative, except as noted in HPI.


General: Reports: No Symptoms


HEENT: Reports: No Symptoms


Pulmonary: Reports: No Symptoms


Cardiovascular: Reports: No Symptoms


Gastrointestinal: Reports: No Symptoms


Genitourinary: Reports: No Symptoms


Musculoskeletal: Reports: No Symptoms


Skin: Reports: No Symptoms


Psychiatric: Reports: No Symptoms


Neurological: Reports: No Symptoms


Hematologic/Lymphatic: Reports: No Symptoms


Immunologic: Reports: No Symptoms





L&D Exam





- Exam


Exam: See Below





- Vital Signs


Vital Signs: 


VSS, afebrile.  See  flowsheet.


Weight: 160 lb





- OB Specific


Fundal Height In cm: 39


Contraction Duration (sec): 


Contraction Frequency (min): 5-6


Contraction Intensity: Mild to Moderate


Fetal Movement: Active


Fetal Heart Tones: Present


Fetal Heart Tones per Min: 120


Fetal Heart Rate (FHR) Variability: Moderate (6-25 bpm)


Birth Presentation: Vertex (via SVE)





- Peres Score


Peres Score Cervix Position: Midposition


Peres Score Consistency: Soft


Peres Score Effacement: 31-50%


Peres Score Dilation: 1-2 cm


Peres Score Infant's Station: -3


Peres Score Total: 5





- Exam


General: Alert, Oriented, Cooperative


HEENT: Conjunctiva Clear, Hearing Intact, Mucosa Moist & [place], PERRLA


Neck: Supple, Trachea Midline


Lungs: Clear to Auscultation, Normal Respiratory Effort


Cardiovascular: Regular Rate, Regular Rhythm


GI/Abdominal Exam: Normal Bowel Sounds, Soft, Non-Tender, No Organomegaly, No 

Distention


Rectal Exam: Deferred


Genitourinary: Normal external exam, Normal bimanual exam, Enlarged uterus


Back Exam: Normal Inspection, Full Range of Motion


Extremities: Normal Inspection, Normal Range of Motion, Non-Tender, No Pedal 

Edema, Normal Capillary Refill


Skin: Warm, Dry, Intact


Neurological: Cranial Nerves Intact, Reflexes Equal Bilateral


Psychiatric: Alert, Normal Affect, Normal Mood





- Patient Data


Lab Results Last 24 hrs: 


                         Laboratory Results - last 24 hr











  10/10/21 10/10/21 10/10/21 Range/Units





  05:15 05:15 05:30 


 


WBC  7.57    (4.0-11.0)  K/uL


 


RBC  3.81 L    (4.30-5.90)  M/uL


 


Hgb  12.5    (12.0-16.0)  g/dL


 


Hct  36.2    (36.0-46.0)  %


 


MCV  95.0    (80.0-98.0)  fL


 


MCH  32.8 H    (27.0-32.0)  pg


 


MCHC  34.5    (31.0-37.0)  g/dL


 


RDW Std Deviation  45.2    (28.0-62.0)  fl


 


RDW Coeff of Shante  13    (11.0-15.0)  %


 


Plt Count  219    (150-400)  K/uL


 


MPV  11.10    (7.40-12.00)  fL


 


Nucleated RBC %  0.0    /100WBC


 


Nucleated RBCs #  0    K/uL


 


SARS-CoV-2 RNA (JAME)    NEGATIVE  (NEGATIVE)  


 


Blood Type   A NEGATIVE   


 


Antibody Screen   POSITIVE   


 


Antibody Identification   Anti-D   











Result Diagrams: 


                                 10/10/21 05:15








- Problem List


(1) Encounter for elective induction of labor


SNOMED Code(s): 608542933


   ICD Code: Z34.90 - ENCNTR FOR SUPRVSN OF NORMAL PREGNANCY, UNSP, UNSP 

TRIMESTER   Status: Acute   Priority: High   Current Visit: Yes   





(2) 40 weeks gestation of pregnancy


SNOMED Code(s): 80650950


   ICD Code: Z3A.40 - 40 WEEKS GESTATION OF PREGNANCY   Status: Acute   

Priority: High   Current Visit: Yes   


Problem List Initiated/Reviewed/Updated: Yes


Orders Last 24hrs: 


                               Active Orders 24 hr











 Category Date Time Status


 


 Admission Status [Patient Status] [ADT] Routine ADT  10/10/21 06:53 Active


 


 Bedrest Bathroom Privileges [RC] ASDIRECTED Care  10/10/21 05:45 Active


 


 Communication Order [RC] ASDIRECTED Care  10/10/21 05:45 Active


 


 Communication Order [RC] ASDIRECTED Care  10/10/21 05:45 Active


 


 Communication Order [RC] ASDIRECTED Care  10/10/21 05:45 Active


 


 May Shower [RC] ASDIRECTED Care  10/10/21 05:45 Active


 


 Notify Provider [RC] PRN Care  10/10/21 05:45 Active


 


 Notify Provider [RC] PRN Care  10/10/21 05:45 Active


 


 Notify Provider [RC] PRN Care  10/10/21 05:45 Active


 


 Notify Provider [RC] STAT Care  10/10/21 05:45 Active


 


 Oxygen Therapy [RC] ASDIRECTED Care  10/10/21 05:45 Active


 


 Up ad Tri [RC] ASDIRECTED Care  10/10/21 05:45 Active


 


 VTE/DVT Education [RC] PER UNIT ROUTINE Care  10/10/21 05:44 Active


 


 Vital Signs [RC] PER UNIT ROUTINE Care  10/10/21 05:45 Active


 


 Vital Signs [RC] PER UNIT ROUTINE Care  10/10/21 05:45 Active


 


 Regular Diet [DIET] Diet  10/10/21 Breakfast Active


 


 RPR (SYPHILIS SERO) W/ RFLX [REF] Routine Lab  10/10/21 05:15 Received


 


 Acetaminophen [TylenoL] Med  10/10/21 05:44 Active





 650 mg PO Q4H PRN   


 


 Butorphanol [Stadol] Med  10/10/21 05:44 Active





 1 mg IVPUSH Q1H PRN   


 


 Carboprost Tromethamine [Hemabate DS] Med  10/10/21 05:44 Active





 250 mcg IM ASDIRECTED PRN   


 


 Lactated Ringers [Ringers, Lactated] 1,000 ml Med  10/10/21 05:45 Active





 IV ASDIRECTED   


 


 Lidocaine 1% [Xylocaine 1%] Med  10/10/21 05:44 Active





 50 ml INJECT ONETIME PRN   


 


 Methylergonovine [Methergine] Med  10/10/21 05:44 Active





 0.2 mg IM ASDIRECTED PRN   


 


 Nalbuphine [Nubain] Med  10/10/21 05:44 Active





 10 mg IVPUSH Q1H PRN   


 


 Ondansetron [Zofran] Med  10/10/21 05:44 Active





 4 mg IVPUSH Q4H PRN   


 


 Oxytocin/0.9 % Sodium Chloride [Oxytocin 30 Unit in NS Med  10/10/21 05:45 

Active





 0.9% 500 ML Premix]   





 30 unit in 500 ml IV TITRATE   


 


 Oxytocin/0.9 % Sodium Chloride [Oxytocin 30 Unit in NS Med  10/10/21 05:45 

Active





 0.9% 500 ML Premix]   





 30 unit in 500 ml IV TITRATE   


 


 Sodium Chloride 0.9% [Normal Saline] Med  10/10/21 05:44 Active





 10 ml IV ASDIRECTED PRN   


 


 Sodium Chloride 0.9% [Saline Flush] Med  10/10/21 05:44 Active





 10 ml FLUSH ASDIRECTED PRN   


 


 Sodium Chloride 0.9% [Saline Flush] Med  10/10/21 05:44 Active





 2.5 ml FLUSH ASDIRECTED PRN   


 


 Terbutaline [Brethine] Med  10/10/21 05:44 Active





 0.25 mg SUBCUT ASDIRECTED PRN   


 


 Tranexamic Acid [Cyklokapron] 1,000 mg Med  10/10/21 05:44 Active





 Sodium Chloride 0.9% [Normal Saline] 100 ml   





 IV ONETIME   


 


 Water For Irrigation,Sterile [Sterile Water for Med  10/10/21 05:44 Active





 Irrigation]   





 1,000 ml IRR ASDIRECTED PRN   


 


 hydrOXYzine HCL [Atarax] Med  10/10/21 05:44 Active





 50 mg PO Q6H PRN   


 


 miSOPROStoL [Cytotec] Med  10/10/21 05:44 Active





 200 mcg PO ONETIME PRN   


 


 miSOPROStoL [Cytotec] Med  10/10/21 08:00 Active





 25 mcg PO Q4HR   


 


 miSOPROStoL [Cytotec] Med  10/10/21 05:44 Active





 25 mcg VAG Q4H PRN   


 


 Fetal Scalp Electrode [WOMSER] Per Unit Routine Oth  10/10/21 05:45 Ordered


 


 Medication Administration Instruction [OM.PC] Q3H Oth  10/10/21 05:45 Ordered


 


 Peripheral IV Insertion Adult [OM.PC] Routine Oth  10/10/21 05:45 Ordered


 


 Resuscitation Status Routine Resus Stat  10/10/21 05:44 Ordered








                                Medication Orders





Acetaminophen (Acetaminophen 325 Mg Tab)  650 mg PO Q4H PRN


   PRN Reason: mild pain and fever


Butorphanol Tartrate (Butorphanol 1 Mg/Ml Sdv)  1 mg IVPUSH Q1H PRN


   PRN Reason: Pain (severe 7-10)


Carboprost Tromethamine (Carboprost Tromethamine 250 Mcg/1 Ml Amp)  250 mcg IM 

ASDIRECTED PRN


   PRN Reason: Post Partum Hemorrhage


Hydroxyzine HCl (Hydroxyzine Hcl 25 Mg Tab)  50 mg PO Q6H PRN


   PRN Reason: Itching


Oxytocin/Sodium Chloride (Oxytocin 30 Unit In Ns 0.9% 500 Ml Premix)  30 unit in

 500 mls @ 2 mls/hr IV TITRATE CHRISTIAN; Protocol


Lactated Ringer's (Ringers, Lactated)  1,000 mls @ 150 mls/hr IV ASDIRECTED CHRISTIAN


Oxytocin/Sodium Chloride (Oxytocin 30 Unit In Ns 0.9% 500 Ml Premix)  30 unit in

 500 mls @ 999 mls/hr IV TITRATE CHRISTIAN


Tranexamic Acid 1,000 mg/ (Sodium Chloride)  110 mls @ 660 mls/hr IV ONETIME PRN


   PRN Reason: Bleeding


Lidocaine HCl (Lidocaine 1% 50 Ml Mdv)  50 ml INJECT ONETIME PRN


   PRN Reason: Laceration repair


Methylergonovine Maleate (Methylergonovine 0.2 Mg/1 Ml Amp)  0.2 mg IM 

ASDIRECTED PRN


   PRN Reason: Post Partum Hemorrhage


Misoprostol (Misoprostol 25 Mcg (1/4 Of 100 Mcg) Tab)  25 mcg PO Q4HR CHRISTIAN


   Last Admin: 10/10/21 06:21  Dose: 25 mcg


   Documented by: BETO


Misoprostol (Misoprostol 25 Mcg (1/4 Of 100 Mcg) Tab)  25 mcg VAG Q4H PRN


   PRN Reason: Cervical Ripening


   Last Admin: 10/10/21 06:21  Dose: 25 mcg


   Documented by: BETO


Misoprostol (Misoprostol 200 Mcg Tab)  200 mcg PO ONETIME PRN


   PRN Reason: Post Partum Hemorrhage


Nalbuphine HCl (Nalbuphine 10 Mg/1 Ml Vial)  10 mg IVPUSH Q1H PRN


   PRN Reason: Pain (severe 7-10)


Ondansetron HCl (Ondansetron 4 Mg/2 Ml Sdv)  4 mg IVPUSH Q4H PRN


   PRN Reason: Nausea/Vomiting


Sodium Chloride (Sodium Chloride 0.9% 10 Ml Syringe)  10 ml FLUSH ASDIRECTED PRN


   PRN Reason: Keep Vein Open


Sodium Chloride (Sodium Chloride 0.9% 2.5 Ml Syringe)  2.5 ml FLUSH ASDIRECTED 

PRN


   PRN Reason: Keep Vein Open


Sodium Chloride (Sodium Chloride 0.9% 10 Ml Sdv)  10 ml IV ASDIRECTED PRN


   PRN Reason: IV Use


Sterile Water (Water For Irrigation,Sterile 1,000 Ml Container)  1,000 ml IRR 

ASDIRECTED PRN


   PRN Reason: delivery


Terbutaline Sulfate (Terbutaline 1 Mg/Ml Sdv)  0.25 mg SUBCUT ASDIRECTED PRN


   PRN Reason: Tacysystole








Assessment/Plan Comment:: 


Admit to inpatient observation for elective IOL in anticipation of  of 

viable, term .  Regular diet.  May ambulate after reactive NST is 

achieved, may intermittently monitor per orders.  May receive epidural 5+ cm if 

desired. See new orders. Dr. Corcoran notified and agreeable with POC.

## 2021-10-11 NOTE — PCM.PNPP
- General Info


Date of Service: 10/11/21


Admission Dx/Problem (Free Text): 


                   Patient Status Order with Admit Dx/Problem





10/10/21 05:45


Patient Status [ADT] Routine 





10/10/21 06:53


Admission Status [Patient Status] [ADT] Routine 








                           Admission Diagnosis/Problem











Admission Diagnosis/Problem    Pregnancy














10/10/21 12:02


Puja is a 25 yo  at 40+2 weeks gestation (SAURABH(LMP) 10/8/2021) that presents

 today for elective IOL, RBAs including cervical ripening techniques  discussed 

and consents signed in office 10/5/2021 with Delfina Rubi CNM.  A neg, Ab 

screen neg, RI, GBS neg.  Prophylactic RhoGam adminiseed 2021. Reports + 

FM; denies contractions, LOF, vaginal bleeding at this time.  Unremarkable 

prenatal course.  Patient confirmed vertex via SVE, confirms she continues to 

desire elective IOL.


Subjective Update: 


Puja is a 25 yo current  PPD 0 s/p  at 40+2 weeks gestation (SAURABH(LMP) 

10/8/2021) following elective IOL with misoprostol cervical ripening and AROM + 

pitocin augmentation.  A neg/A neg, Ab screen positive for D ab consistent with 

RhoGam RI, GBS neg.  Currently resting in bed, infant in arms latched to right 

breast.  Independently eating, hydrating, voiding, and ambulating.  Breast fee

ding well, no problems.  Denies any complaints or concerns at this time except 

mild to moderate intermittent uterine cramping relieved with ibuprofen.  Reports

 small vaginal bleeding, no clots.  No other problems or concerns at this time.





Functional Status: Reports: Pain Controlled, Tolerating Diet, Ambulating, 

Urinating





- Review of Systems


General: Reports: No Symptoms


HEENT: Reports: No Symptoms


Pulmonary: Reports: No Symptoms


Cardiovascular: Reports: No Symptoms


Gastrointestinal: Reports: No Symptoms


Genitourinary: Reports: No Symptoms


Musculoskeletal: Reports: No Symptoms


Skin: Reports: No Symptoms


Neurological: Reports: No Symptoms


Psychiatric: Reports: No Symptoms





- General Info


Date of Service: 10/11/21





- Patient Data


Vital Signs - Most Recent: 


VSS, afebrile


Weight - Most Recent: 160 lb


I&O - Last 24 Hours: 


                                 Intake & Output











 10/10/21 10/10/21 10/11/21





 14:59 22:59 06:59


 


Intake Total   500


 


Balance   500











Lab Results - Last 24 Hours: 


                         Laboratory Results - last 24 hr











  10/10/21 10/10/21 10/10/21 Range/Units





  05:15 05:15 05:30 


 


WBC  7.57    (4.0-11.0)  K/uL


 


RBC  3.81 L    (4.30-5.90)  M/uL


 


Hgb  12.5    (12.0-16.0)  g/dL


 


Hct  36.2    (36.0-46.0)  %


 


MCV  95.0    (80.0-98.0)  fL


 


MCH  32.8 H    (27.0-32.0)  pg


 


MCHC  34.5    (31.0-37.0)  g/dL


 


RDW Std Deviation  45.2    (28.0-62.0)  fl


 


RDW Coeff of Shante  13    (11.0-15.0)  %


 


Plt Count  219    (150-400)  K/uL


 


MPV  11.10    (7.40-12.00)  fL


 


Nucleated RBC %  0.0    /100WBC


 


Nucleated RBCs #  0    K/uL


 


SARS-CoV-2 RNA (JAME)    NEGATIVE  (NEGATIVE)  


 


Blood Type   A NEGATIVE   


 


Antibody Screen   POSITIVE   


 


Antibody Identification   Anti-D   











Med Orders - Current: 


                               Current Medications





Acetaminophen (Acetaminophen 500 Mg Tab)  500 mg PO Q4H PRN


   PRN Reason: Pain (mild 1-3)


Acetaminophen (Acetaminophen 500 Mg Tab)  1,000 mg PO Q4H PRN


   PRN Reason: Pain (mild 1-3)


   Last Admin: 10/11/21 01:31 Dose:  1,000 mg


   Documented by: 


Benzocaine/Menthol (Benzocaine/Menthol 20%-0.5% Spray 78 Gm Cannister)  78 gm 

TOP ASDIRECTED PRN


   PRN Reason: Perineal Comfort Measure


Bisacodyl (Bisacodyl 10 Mg Supp)  10 mg RECTAL ONETIME PRN


   PRN Reason: Constipation


Docusate Sodium (Docusate Sodium 100 Mg Cap)  100 mg PO Q12H PRN


   PRN Reason: Constipation


Emollient Ointment (Lanolin 100% Cream 7 Gm Tube)  0 gm TOP ASDIRECTED PRN


   PRN Reason: Sore Nipples


Ibuprofen (Ibuprofen 400 Mg Tab)  400 mg PO Q4H PRN


   PRN Reason: Pain (mild 1-3)


Ibuprofen (Ibuprofen 800 Mg Tab)  800 mg PO Q6H PRN


   PRN Reason: Cramping


   Last Admin: 10/11/21 01:32 Dose:  800 mg


   Documented by: 


Oxycodone HCl (Oxycodone 5 Mg Tab)  5 mg PO Q2H PRN


   PRN Reason: Pain (severe 7-10)


Witch Hazel (Witch Hazel Medicated Pads 40/Jar)  1 pad TOP ASDIRECTED PRN


   PRN Reason: comfort care





Discontinued Medications





Acetaminophen (Acetaminophen 325 Mg Tab)  650 mg PO Q4H PRN


   PRN Reason: mild pain and fever


Butorphanol Tartrate (Butorphanol 1 Mg/Ml Sdv)  1 mg IVPUSH Q1H PRN


   PRN Reason: Pain (severe 7-10)


Carboprost Tromethamine (Carboprost Tromethamine 250 Mcg/1 Ml Amp)  250 mcg IM 

ASDIRECTED PRN


   PRN Reason: Post Partum Hemorrhage


Ephedrine Sulfate (Ephedrine 50 Mg/Ml Sdv)  10 mg IVPUSH Q1M PRN


   PRN Reason: Hypotension


Hydroxyzine HCl (Hydroxyzine Hcl 25 Mg Tab)  50 mg PO Q6H PRN


   PRN Reason: Itching


Oxytocin/Sodium Chloride (Oxytocin 30 Unit In Ns 0.9% 500 Ml Premix)  30 unit in

 500 mls @ 2 mls/hr IV TITRATE CHRISTIAN; Protocol


   Last Titration: 10/10/21 23:23 Dose:  500 munits/min, 500 mls/hr


   Documented by: 


Lactated Ringer's (Ringers, Lactated)  1,000 mls @ 150 mls/hr IV ASDIRECTED CHRISTIAN


   Last Admin: 10/10/21 20:21 Dose:  150 mls/hr


   Documented by: 


Oxytocin/Sodium Chloride (Oxytocin 30 Unit In Ns 0.9% 500 Ml Premix)  30 unit in

 500 mls @ 999 mls/hr IV TITRATE CHRISTIAN


Tranexamic Acid 1,000 mg/ (Sodium Chloride)  110 mls @ 660 mls/hr IV ONETIME PRN


   PRN Reason: Bleeding


Ropivacaine (Naropin 0.2%) Confirm Administered Dose 200 mls @ as directed .BRIANNA RobertsonSTK-MED ONE


   Stop: 10/10/21 17:07


Lidocaine HCl (Lidocaine 1% 50 Ml Mdv)  50 ml INJECT ONETIME PRN


   PRN Reason: Laceration repair


Methylergonovine Maleate (Methylergonovine 0.2 Mg/1 Ml Amp)  0.2 mg IM 

ASDIRECTED PRN


   PRN Reason: Post Partum Hemorrhage


Miscellaneous Medication (Phenylephrine Hcl In 0.9% Nacl 1 Mg/10 Ml Syringe)  

0.1 mg IVPUSH Q1M PRN


   PRN Reason: Hypotension


Misoprostol (Misoprostol 25 Mcg (1/4 Of 100 Mcg) Tab)  25 mcg PO Q4HR CHRISTIAN


   Last Admin: 10/10/21 06:21 Dose:  25 mcg


   Documented by: 


Misoprostol (Misoprostol 25 Mcg (1/4 Of 100 Mcg) Tab)  25 mcg VAG Q4H PRN


   PRN Reason: Cervical Ripening


   Last Admin: 10/10/21 06:21 Dose:  25 mcg


   Documented by: 


Misoprostol (Misoprostol 200 Mcg Tab)  200 mcg PO ONETIME PRN


   PRN Reason: Post Partum Hemorrhage


Nalbuphine HCl (Nalbuphine 10 Mg/1 Ml Vial)  10 mg IVPUSH Q1H PRN


   PRN Reason: Pain (severe 7-10)


Ondansetron HCl (Ondansetron 4 Mg/2 Ml Sdv)  4 mg IVPUSH Q4H PRN


   PRN Reason: Nausea/Vomiting


Ropivacaine (Ropivacaine/Pf 400 Mg/200 Ml Pca)  400 mg EPIDUR ASDIRECTED CHRISTIAN


Sodium Chloride (Sodium Chloride 0.9% 10 Ml Syringe)  10 ml FLUSH ASDIRECTED PRN


   PRN Reason: Keep Vein Open


Sodium Chloride (Sodium Chloride 0.9% 2.5 Ml Syringe)  2.5 ml FLUSH ASDIRECTED 

PRN


   PRN Reason: Keep Vein Open


Sodium Chloride (Sodium Chloride 0.9% 10 Ml Sdv)  10 ml IV ASDIRECTED PRN


   PRN Reason: IV Use


Sterile Water (Water For Irrigation,Sterile 1,000 Ml Container)  1,000 ml IRR 

ASDIRECTED PRN


   PRN Reason: delivery


Terbutaline Sulfate (Terbutaline 1 Mg/Ml Sdv)  0.25 mg SUBCUT ASDIRECTED PRN


   PRN Reason: Tacysystole











- Infant Interaction


Infant Disposition, Postpartum: [place] at Bedside


Infant Interaction: Holding Infant


Infant Feeding:  Infant; Nursed Well, Continues to Breastfeed, 

Encouraged to Breastfeed


Support Person: Significant Other





- Postpartum Recovery Exam


Fundal Level: At Umbilicus


Fundal Placement: Midline


Lochia Amount: Small


Lochia Color: Rubra/Red


Perineum Description: Edematous


Episiotomy/Laceration: None


Bladder Status: Voiding


Urinary Elimination: Voided





- Exam


General: Alert, Oriented, Cooperative, No Acute Distress


HEENT: Pupils Equal, Mucous Membr. Moist/Pink


Neck: Supple


Lungs: Clear to Auscultation, Normal Respiratory Effort


Cardiovascular: Regular Rate, Regular Rhythm


GI/Abdominal Exam: Normal Bowel Sounds, Soft, Non-Tender, No Organomegaly, No 

Distention


Extremities: Normal Inspection, Normal Range of Motion, Non-Tender, No Pedal 

Edema, Normal Capillary Refill


Skin: Warm, Dry, Intact


Neurological: No New Focal Deficit


Psy/Mental Status: Alert, Normal Affect, Normal Mood





- Problem List & Annotations


(1)  (spontaneous vaginal delivery)


SNOMED Code(s): 492797015


   Code(s): O80 - ENCOUNTER FOR FULL-TERM UNCOMPLICATED DELIVERY   Status: Acute

   Priority: High   Current Visit: Yes   





(2) Lactating mother


SNOMED Code(s): 991806356, 292310066


   Code(s): Z39.1 - ENCOUNTER FOR CARE AND EXAMINATION OF LACTATING MOTHER   

Status: Acute   Priority: High   Current Visit: Yes   





- Problem List Review


Problem List Initiated/Reviewed/Updated: Yes





- My Orders


Last 24 Hours: 


My Active Orders





10/10/21 Dinner


Regular Diet [DIET] 





10/10/21 23:34


Patient Status [ADT] Routine 


May Shower [RC] ASDIRECTED 


Up ad Tri [RC] ASDIRECTED 


Vital Signs [RC] PER UNIT ROUTINE 


Acetaminophen [Tylenol Extra Strength]   1,000 mg PO Q4H PRN 


Acetaminophen [Tylenol Extra Strength]   500 mg PO Q4H PRN 


Benzocaine/Menthol [Dermoplast Pain Relief 20%-0.5% Spray]   78 gm TOP 

ASDIRECTED PRN 


Docusate Sodium [Colace]   100 mg PO Q12H PRN 


Ibuprofen [Motrin]   400 mg PO Q4H PRN 


Ibuprofen [Motrin]   800 mg PO Q6H PRN 


Lanolin [Lansinoh HPA]   See Dose Instructions  TOP ASDIRECTED PRN 


bisacodyL [Dulcolax]   10 mg RECTAL ONETIME PRN 


oxyCODONE   5 mg PO Q2H PRN 


witch hazeL [Tucks]   1 pad TOP ASDIRECTED PRN 


Assess Lochia [WOMSER] Per Unit Routine 


Assess Uterine Involution [WOMSER] Per Unit Routine 


Ice Therapy [OM.PC] Per Unit Routine 


Perineal Care [OM.PC] Per Unit Routine 


Peripheral IV Discontinue [OM.PC] Routine 


Sitz Bath [OM.PC] Per Unit Routine 


Resuscitation Status Routine 





10/10/21 23:35


Cooling Warming Measures [RC] ASDIRECTED 














- Plan


Plan:: 


Continue inpatient postpartum course of care PPD 0 s/p  of viable, term 

 following elective IOL with misoprostol cervical ripening and 

AROM+pitocin augmentation.  Continue regular diet.  Plan to ambulate 3-5 times 

daily.  See new orders. Dr. Corcoran notified and agreeable with POC.

## 2021-10-11 NOTE — [id number]
Post Anesthesia Note





- EVALUATION WITHIN 48HRS OF ANESTHETIC


Vital Signs in Normal Range: Yes


Patient Participated in Evaluation: Yes


Respiratory Function Stable: Yes


Airway Patent: Yes


Cardiovascular Function Stable: Yes


Hydration Status Stable: Yes


Pain Control Satisfactory: Yes


Nausea and Vomiting Control Satisfactory: Yes


Mental Status Recovered: Yes


Vital Signs: 


                                Last Vital Signs











Temp  36.5 C   10/11/21 08:00


 


Pulse  56 L  10/11/21 08:00


 


Resp  16   10/11/21 08:00


 


BP  121/71   10/11/21 08:00


 


Pulse Ox  100   10/11/21 08:00














- COMMENTS/OBSERVATIONS


Free Text/Narrative:: 





pt states to being up and walking with no problems